# Patient Record
Sex: FEMALE | Race: WHITE | NOT HISPANIC OR LATINO | Employment: FULL TIME | URBAN - METROPOLITAN AREA
[De-identification: names, ages, dates, MRNs, and addresses within clinical notes are randomized per-mention and may not be internally consistent; named-entity substitution may affect disease eponyms.]

---

## 2017-01-30 ENCOUNTER — DOCUMENTATION ONLY (OUTPATIENT)
Dept: FAMILY MEDICINE | Facility: CLINIC | Age: 27
End: 2017-01-30

## 2017-01-30 NOTE — PROGRESS NOTES
Panel Management Review      Patient has the following on her problem list: None      Composite cancer screening  Chart review shows that this patient is due/due soon for the following Pap Smear  Summary:    Patient is due/failing the following:   PAP    Action needed:   Patient needs office visit for PAP.    Type of outreach:    Sent letter.    Questions for provider review:    None                                                                                                                                    Zhane Rush CMA ([facility]) 1/30/2017 7:33 AM

## 2017-01-30 NOTE — Clinical Note
January 30, 2017      Carleen J Oates  [address]  [facility] [address]        Dear Ms. Carleen Oates,    Our records show that you are currently due for a PAP test to screen for cervical cancer. Please call our clinic at [phone] to schedule this appointment or to update your chart if you have already had this completed.    If you have any further questions or concerns please feel free to contact us by calling our clinic at [phone].      Sincerely,     Pricila Moe PA-C/ivon

## 2017-04-05 ENCOUNTER — OFFICE VISIT (OUTPATIENT)
Dept: FAMILY MEDICINE | Facility: CLINIC | Age: 27
End: 2017-04-05
Payer: COMMERCIAL

## 2017-04-05 VITALS
DIASTOLIC BLOOD PRESSURE: 72 MMHG | OXYGEN SATURATION: 97 % | TEMPERATURE: 98.3 F | BODY MASS INDEX: 33.38 KG/M2 | HEART RATE: 78 BPM | WEIGHT: 200.6 LBS | SYSTOLIC BLOOD PRESSURE: 118 MMHG

## 2017-04-05 DIAGNOSIS — Z00.00 ENCOUNTER FOR ROUTINE ADULT HEALTH EXAMINATION WITHOUT ABNORMAL FINDINGS: Primary | ICD-10-CM

## 2017-04-05 DIAGNOSIS — Z11.3 SCREEN FOR STD (SEXUALLY TRANSMITTED DISEASE): ICD-10-CM

## 2017-04-05 PROCEDURE — G0124 SCREEN C/V THIN LAYER BY MD: HCPCS | Performed by: NURSE PRACTITIONER

## 2017-04-05 PROCEDURE — 87491 CHLMYD TRACH DNA AMP PROBE: CPT | Performed by: NURSE PRACTITIONER

## 2017-04-05 PROCEDURE — 99395 PREV VISIT EST AGE 18-39: CPT | Performed by: NURSE PRACTITIONER

## 2017-04-05 PROCEDURE — 87591 N.GONORRHOEAE DNA AMP PROB: CPT | Performed by: NURSE PRACTITIONER

## 2017-04-05 PROCEDURE — G0145 SCR C/V CYTO,THINLAYER,RESCR: HCPCS | Performed by: NURSE PRACTITIONER

## 2017-04-05 NOTE — MR AVS SNAPSHOT
After Visit Summary   4/5/2017    Carleen Oates    MRN: [id number]           Patient Information     Date Of Birth          1990        Visit Information        Provider Department      4/5/2017 8:00 AM Kelsea Short Ra, APRN [facility] [place]        Today's Diagnoses     Encounter for routine adult health examination without abnormal findings    -  1      Care Instructions      Preventive Health Recommendations  Female Ages 26 - 39  Yearly exam:   See your health care provider every year in order to    Review health changes.     Discuss preventive care.      Review your medicines if you your doctor has prescribed any.    Until age 30: Get a Pap test every three years (more often if you have had an abnormal result).    After age 30: Talk to your doctor about whether you should have a Pap test every 3 years or have a Pap test with HPV screening every 5 years.   You do not need a Pap test if your uterus was removed (hysterectomy) and you have not had cancer.  You should be tested each year for STDs (sexually transmitted diseases), if you're at risk.   Talk to your provider about how often to have your cholesterol checked.  If you are at risk for diabetes, you should have a diabetes test (fasting glucose).  Shots: Get a flu shot each year. Get a tetanus shot every 10 years.   Nutrition:     Eat at least 5 servings of fruits and vegetables each day.    Eat whole-grain bread, whole-wheat pasta and brown rice instead of white grains and rice.    Talk to your provider about Calcium and Vitamin D.     Lifestyle    Exercise at least 150 minutes a week (30 minutes a day, 5 days of the week). This will help you control your weight and prevent disease.    Limit alcohol to one drink per day.    No smoking.     Wear sunscreen to prevent skin cancer.    See your dentist every six months for an exam and cleaning.          Follow-ups after your visit        Who to contact     If you have  "questions or need follow up information about today's clinic visit or your schedule please contact [facility] directly at [phone].  Normal or non-critical lab and imaging results will be communicated to you by MyChart, letter or phone within 4 business days after the clinic has received the results. If you do not hear from us within 7 days, please contact the clinic through "[employer]hart or phone. If you have a critical or abnormal lab result, we will notify you by phone as soon as possible.  Submit refill requests through "[employer] or call your pharmacy and they will forward the refill request to us. Please allow 3 business days for your refill to be completed.          Additional Information About Your Visit        "[employer] Information     "[employer] lets you send messages to your doctor, view your test results, renew your prescriptions, schedule appointments and more. To sign up, go to [email or web address] . Click on \"Log in\" on the left side of the screen, which will take you to the Welcome page. Then click on \"Sign up Now\" on the right side of the page.     You will be asked to enter the access code listed below, as well as some personal information. Please follow the directions to create your username and password.     Your access code is: [email or web address]  Expires: 2017  8:41 AM     Your access code will  in 90 days. If you need help or a new code, please call your [place] clinic or [phone].        Care EveryWhere ID     This is your Care EveryWhere ID. This could be used by other organizations to access your [place] medical records  [id number]        Your Vitals Were     Pulse Temperature Pulse Oximetry BMI (Body Mass Index)          78 98.3  F (36.8  C) (Oral) 97% 33.38 kg/m2         Blood Pressure from Last 3 Encounters:   17 118/72   10/11/16 112/60   16 122/72    Weight from Last 3 Encounters:   17 200 lb 9.6 oz (91 kg)   10/11/16 187 lb 7 oz (85 kg) "   07/13/16 182 lb (82.6 kg)              We Performed the Following     Pap imaged thin layer screen reflex to HPV if ASCUS - recommend age 25 - 29        Primary Care Provider Office Phone # Fax #    Pricila Moe PA-C [phone]       [facility] [address]  [facility] [address]        Thank you!     Thank you for choosing [facility]  for your care. Our goal is always to provide you with excellent care. Hearing back from our patients is one way we can continue to improve our services. Please take a few minutes to complete the written survey that you may receive in the mail after your visit with us. Thank you!             Your Updated Medication List - Protect others around you: Learn how to safely use, store and throw away your medicines at www.disposemymeds.org.          This list is accurate as of: 4/5/17  8:41 AM.  Always use your most recent med list.                   Brand Name Dispense Instructions for use    ALLEGRA PO      Take 180 mg by mouth daily       levonorgestrel 20 MCG/24HR IUD    MIRENA    1 each    1 each (20 mcg) by Intrauterine route once       sertraline 100 MG tablet    ZOLOFT    90 tablet    Take 1 tablet (100 mg) by mouth daily       TYLENOL 8 HOUR PO      Take  by mouth.

## 2017-04-05 NOTE — NURSING NOTE
"Chief Complaint   Patient presents with     Physical       Initial /72  Pulse 78  Temp 98.3  F (36.8  C) (Oral)  Wt 200 lb 9.6 oz (91 kg)  SpO2 97%  BMI 33.38 kg/m2 Estimated body mass index is 33.38 kg/(m^2) as calculated from the following:    Height as of 10/11/16: 5' 5\" (1.651 m).    Weight as of this encounter: 200 lb 9.6 oz (91 kg).  Medication Reconciliation: complete   Kaley WYNN M.A.      "

## 2017-04-05 NOTE — LETTER
April 14, 2017      Carleen Oates  [address]  [facility][[address]] [address]          Dear Carleen,    The results of your recent labs were normal.  There was no infection seen.  Please let me know if you have any questions or concerns.    Thank you,    SELIN Grewal

## 2017-04-05 NOTE — LETTER
April 19, 2017      Carleen ISA Иван  [address]  [facility] [address]    Dear ,      I am happy to inform you that your recent cervical cancer screening test (PAP smear) was normal.      Preventative screenings such as this help to ensure your health for years to come. You should repeat a pap smear in 1 year, unless otherwise directed.      You will also need to return to the clinic every year for your annual exam and other preventive tests.     Please contact the clinic at [phone] if you have further questions.       Sincerely,      LISETH Grewal Ra CNP/esh

## 2017-04-06 LAB
C TRACH DNA SPEC QL NAA+PROBE: NORMAL
N GONORRHOEA DNA SPEC QL NAA+PROBE: NORMAL
SPECIMEN SOURCE: NORMAL
SPECIMEN SOURCE: NORMAL

## 2017-04-07 ENCOUNTER — TELEPHONE (OUTPATIENT)
Dept: FAMILY MEDICINE | Facility: CLINIC | Age: 27
End: 2017-04-07

## 2017-04-07 DIAGNOSIS — G47.00 INSOMNIA, UNSPECIFIED TYPE: Primary | ICD-10-CM

## 2017-04-07 RX ORDER — HYDROXYZINE HYDROCHLORIDE 25 MG/1
25-50 TABLET, FILM COATED ORAL
Qty: 60 TABLET | Refills: 1 | Status: SHIPPED | OUTPATIENT
Start: 2017-04-07 | End: 2018-04-17

## 2017-04-07 NOTE — TELEPHONE ENCOUNTER
Called patient to let her know about the prescription.   She is agreeable to try it this weekend, and let us know  How she is doing with it.   Paty Rg, RN  Triage Nurse

## 2017-04-07 NOTE — TELEPHONE ENCOUNTER
Patient seen this past week.  Patient is calling to ask for something for sleep. She has taken   Tylenol pm, and you had mentioned that you would prescribe something   For her that was similar but did not have the risk of liver problems.   (not mentioned in visit note)  She has been on Ambien, but mentioned that it was too strong for her.   Please advise.   Paty Rg, MARISSA  Triage Nurse

## 2017-04-07 NOTE — TELEPHONE ENCOUNTER
Rx sent.  Hydroxyzine.  Start with 1/2 tab, may increase if needed.  Reviewed no drinking, driving, or big decisions.    MELVIN

## 2017-04-10 LAB
COPATH REPORT: NORMAL
PAP: NORMAL

## 2017-04-19 ENCOUNTER — RESULT FOLLOW UP (OUTPATIENT)
Dept: FAMILY MEDICINE | Facility: CLINIC | Age: 27
End: 2017-04-19

## 2017-04-19 ENCOUNTER — TELEPHONE (OUTPATIENT)
Dept: FAMILY MEDICINE | Facility: CLINIC | Age: 27
End: 2017-04-19

## 2017-04-19 DIAGNOSIS — R87.612 PAPANICOLAOU SMEAR OF CERVIX WITH LOW GRADE SQUAMOUS INTRAEPITHELIAL LESION (LGSIL): ICD-10-CM

## 2017-04-19 NOTE — LETTER
April 3, 2019      Carleen J Иван  [address]  [facility] [address]    Dear ,      This letter is to remind you that you are due for your follow up PAP smear on or about 04/17/19.    Please call [phone] to schedule your appointment at your earliest convenience.     If you have completed the tests outside of [place], please have the results forwarded to our office. We will update the chart for your primary Physician to review before your next annual physical.     Sincerely,      Your [place] Care Team/[facility]

## 2017-04-19 NOTE — TELEPHONE ENCOUNTER
Pap screening from 04/05/17 = Normal    Pap history:  03/29/12 LSIL  09/20/12 [place]= GARY 1  01/22/16 Pap= NIL.   Now with current Normal result.    Kelsea- Please advise as pt's history falls outside of guidelines.  Would you like to get another normal result in 1 year before going to routine 3 yr screening?    Thank you  Lynette Nissen RN

## 2017-04-19 NOTE — LETTER
March 23, 2018      Carleen J Иван  [address]  [place] [facility][[address]] [address]    Dear ,      This letter is to remind you that you are due for your follow up PAP smear on or about 4/5/18.    Please call [phone] to schedule your appointment at your earliest convenience.     If you have completed the tests outside of [place], please have the results forwarded to our office. We will update the chart for your primary Physician to review before your next annual physical.     Sincerely,      LISETH Grewal Ra CNP/rlm

## 2017-04-19 NOTE — PROGRESS NOTES
03/29/12 LSIL  09/20/12 [place]= GARY 1, r/p 6 months, due March 2013 01/22/16 Pap= NIL. 1 yr repeat  4/05/17 Pap = NIL. 1 yr repeat pap cyto. Due 4/2018  3/23/18 Pap reminder letter sent ([facility])  4/17/18 NIL pap. Plan 1 yr repeat pap. Due 4/17/19 05/09/18 Result letter sent at request of RN. ([facility])  04/03/19 Pap reminder letter sent. ([facility])  05/07/19 [facility] clinic and schedule. ([facility])  06/04/19 Patient is lost to pap tracking follow-up. FYI routed to provider. ([facility])

## 2017-09-11 ENCOUNTER — TELEPHONE (OUTPATIENT)
Dept: FAMILY MEDICINE | Facility: CLINIC | Age: 27
End: 2017-09-11

## 2017-09-11 DIAGNOSIS — F41.1 GENERALIZED ANXIETY DISORDER: ICD-10-CM

## 2017-09-11 DIAGNOSIS — F33.0 MAJOR DEPRESSIVE DISORDER, RECURRENT EPISODE, MILD WITH ANXIOUS DISTRESS (H): ICD-10-CM

## 2017-09-12 NOTE — TELEPHONE ENCOUNTER
sertraline (ZOLOFT) 100 MG     Last Written Prescription Date: 10/11/16  Last Fill Quantity: 90, # refills: 3  Last Office Visit with G primary care provider:  4/5/17        Last PHQ-9 score on record=   PHQ-9 SCORE 12/20/2016   Total Score -   Total Score 1

## 2017-09-14 RX ORDER — SERTRALINE HYDROCHLORIDE 100 MG/1
TABLET, FILM COATED ORAL
Qty: 30 TABLET | Refills: 0 | Status: SHIPPED | OUTPATIENT
Start: 2017-09-14 | End: 2017-09-19

## 2017-09-14 NOTE — TELEPHONE ENCOUNTER
Prescription refilled x 1 per [facility] Refill Protocol.    Routing to Station Nurse Pool, please call to update a PHQ9.

## 2017-09-19 RX ORDER — SERTRALINE HYDROCHLORIDE 100 MG/1
100 TABLET, FILM COATED ORAL DAILY
Qty: 90 TABLET | Refills: 1 | Status: SHIPPED | OUTPATIENT
Start: 2017-09-19 | End: 2018-04-11

## 2017-09-19 ASSESSMENT — PATIENT HEALTH QUESTIONNAIRE - PHQ9: SUM OF ALL RESPONSES TO PHQ QUESTIONS 1-9: 2

## 2017-09-19 NOTE — TELEPHONE ENCOUNTER
Patient completed PHQ9 over phone.    PHQ-9 SCORE 11/15/2016 12/20/2016 9/19/2017   Total Score - - -   Total Score 1 1 2     Refill sent to pharmacy.    Karmen Quintero RN

## 2017-10-12 DIAGNOSIS — F33.0 MAJOR DEPRESSIVE DISORDER, RECURRENT EPISODE, MILD WITH ANXIOUS DISTRESS (H): ICD-10-CM

## 2017-10-12 DIAGNOSIS — F41.1 GENERALIZED ANXIETY DISORDER: ICD-10-CM

## 2017-10-13 RX ORDER — SERTRALINE HYDROCHLORIDE 100 MG/1
TABLET, FILM COATED ORAL
Qty: 90 TABLET | Refills: 0 | OUTPATIENT
Start: 2017-10-13

## 2017-10-13 NOTE — TELEPHONE ENCOUNTER
Chart review confirms medication was sent 9/19/17 to requesting pharmacy, 6 month supply. Sent back as denied/duplicate and attached comments to see RX already sent 9/19/17    Adelaida Reich, MSN, RN-BC  Care Coordinator

## 2017-10-13 NOTE — TELEPHONE ENCOUNTER
sertraline (ZOLOFT) 100 MG     Last Written Prescription Date: 9/19/17  Last Fill Quantity: 90, # refills: 1  Last Office Visit with G primary care provider:  4/5/17        Last PHQ-9 score on record=   PHQ-9 SCORE 9/19/2017   Total Score -   Total Score 2

## 2017-10-20 ENCOUNTER — TELEPHONE (OUTPATIENT)
Dept: OTHER | Facility: CLINIC | Age: 27
End: 2017-10-20

## 2018-04-11 DIAGNOSIS — F41.1 GENERALIZED ANXIETY DISORDER: ICD-10-CM

## 2018-04-11 DIAGNOSIS — F33.0 MAJOR DEPRESSIVE DISORDER, RECURRENT EPISODE, MILD WITH ANXIOUS DISTRESS (H): ICD-10-CM

## 2018-04-11 RX ORDER — SERTRALINE HYDROCHLORIDE 100 MG/1
TABLET, FILM COATED ORAL
Qty: 30 TABLET | Refills: 0 | Status: SHIPPED | OUTPATIENT
Start: 2018-04-11 | End: 2018-05-06

## 2018-04-11 NOTE — TELEPHONE ENCOUNTER
"Requested Prescriptions   Pending Prescriptions Disp Refills     sertraline (ZOLOFT) 100 MG tablet [Pharmacy Med Name: SERTRALINE 100MG TABLETS]  Last Written Prescription Date:  09/19/2017  Last Fill Quantity: 90 tablet,  # refills: 1   Last office visit: 4/5/2017 with prescribing provider:  Kelsea Short Ra, APRN CNP    Future Office Visit:     90 tablet 0     Sig: TAKE 1 TABLET(100 MG) BY MOUTH DAILY    SSRIs Protocol Failed    4/11/2018 10:40 AM       Failed - PHQ-9 score less than 5 in past 6 months    Please review last PHQ-9 score.   PHQ-9 SCORE 11/15/2016 12/20/2016 9/19/2017   Total Score - - -   Total Score 1 1 2     CLARIBEL-7 SCORE 10/27/2016 11/15/2016 12/20/2016   Total Score - - -   Total Score 0 0 0              Passed - Patient is age 18 or older       Passed - No active pregnancy on record       Passed - No positive pregnancy test in last 12 months       Passed - Recent (6 mo) or future (30 days) visit within the authorizing provider's specialty    Patient had office visit in the last 6 months or has a visit in the next 30 days with authorizing provider or within the authorizing provider's specialty.  See \"Patient Info\" tab in inbasket, or \"Choose Columns\" in Meds & Orders section of the refill encounter.              "

## 2018-04-11 NOTE — TELEPHONE ENCOUNTER
Pt is overdue for office visit with PCP.   Is scheduled for 4/17/18 for full Physical.   Sent in 30 day supply for the Pt.     Shyann Tapia RN -- [facility]

## 2018-04-17 ENCOUNTER — OFFICE VISIT (OUTPATIENT)
Dept: FAMILY MEDICINE | Facility: CLINIC | Age: 28
End: 2018-04-17
Payer: COMMERCIAL

## 2018-04-17 VITALS
RESPIRATION RATE: 16 BRPM | WEIGHT: 195.5 LBS | OXYGEN SATURATION: 97 % | SYSTOLIC BLOOD PRESSURE: 118 MMHG | BODY MASS INDEX: 32.57 KG/M2 | DIASTOLIC BLOOD PRESSURE: 78 MMHG | HEART RATE: 92 BPM | HEIGHT: 65 IN | TEMPERATURE: 98.1 F

## 2018-04-17 DIAGNOSIS — Z00.00 ROUTINE GENERAL MEDICAL EXAMINATION AT A HEALTH CARE FACILITY: Primary | ICD-10-CM

## 2018-04-17 DIAGNOSIS — R87.612 PAPANICOLAOU SMEAR OF CERVIX WITH LOW GRADE SQUAMOUS INTRAEPITHELIAL LESION (LGSIL): ICD-10-CM

## 2018-04-17 DIAGNOSIS — Z72.0 TOBACCO ABUSE: ICD-10-CM

## 2018-04-17 DIAGNOSIS — E66.9 OBESITY WITHOUT SERIOUS COMORBIDITY, UNSPECIFIED CLASSIFICATION, UNSPECIFIED OBESITY TYPE: ICD-10-CM

## 2018-04-17 DIAGNOSIS — Z30.09 BIRTH CONTROL COUNSELING: ICD-10-CM

## 2018-04-17 DIAGNOSIS — Z12.4 SCREENING FOR MALIGNANT NEOPLASM OF CERVIX: ICD-10-CM

## 2018-04-17 LAB
ERYTHROCYTE [DISTWIDTH] IN BLOOD BY AUTOMATED COUNT: 12.4 % (ref 10–15)
HCT VFR BLD AUTO: 39.2 % (ref 35–47)
HGB BLD-MCNC: 13.1 G/DL (ref 11.7–15.7)
MCH RBC QN AUTO: 30.5 PG (ref 26.5–33)
MCHC RBC AUTO-ENTMCNC: 33.4 G/DL (ref 31.5–36.5)
MCV RBC AUTO: 91 FL (ref 78–100)
PLATELET # BLD AUTO: 286 10E9/L (ref 150–450)
RBC # BLD AUTO: 4.3 10E12/L (ref 3.8–5.2)
WBC # BLD AUTO: 6 10E9/L (ref 4–11)

## 2018-04-17 PROCEDURE — 84443 ASSAY THYROID STIM HORMONE: CPT | Performed by: PHYSICIAN ASSISTANT

## 2018-04-17 PROCEDURE — 80061 LIPID PANEL: CPT | Performed by: PHYSICIAN ASSISTANT

## 2018-04-17 PROCEDURE — 36415 COLL VENOUS BLD VENIPUNCTURE: CPT | Performed by: PHYSICIAN ASSISTANT

## 2018-04-17 PROCEDURE — 87591 N.GONORRHOEAE DNA AMP PROB: CPT | Performed by: PHYSICIAN ASSISTANT

## 2018-04-17 PROCEDURE — 88175 CYTOPATH C/V AUTO FLUID REDO: CPT | Performed by: PHYSICIAN ASSISTANT

## 2018-04-17 PROCEDURE — 87491 CHLMYD TRACH DNA AMP PROBE: CPT | Performed by: PHYSICIAN ASSISTANT

## 2018-04-17 PROCEDURE — 99395 PREV VISIT EST AGE 18-39: CPT | Performed by: PHYSICIAN ASSISTANT

## 2018-04-17 PROCEDURE — 80053 COMPREHEN METABOLIC PANEL: CPT | Performed by: PHYSICIAN ASSISTANT

## 2018-04-17 PROCEDURE — 99212 OFFICE O/P EST SF 10 MIN: CPT | Mod: 25 | Performed by: PHYSICIAN ASSISTANT

## 2018-04-17 PROCEDURE — 88141 CYTOPATH C/V INTERPRET: CPT | Performed by: PHYSICIAN ASSISTANT

## 2018-04-17 PROCEDURE — 85027 COMPLETE CBC AUTOMATED: CPT | Performed by: PHYSICIAN ASSISTANT

## 2018-04-17 RX ORDER — MISOPROSTOL 200 UG/1
TABLET ORAL
Qty: 1 TABLET | Refills: 0 | Status: SHIPPED | OUTPATIENT
Start: 2018-04-17 | End: 2018-06-20

## 2018-04-17 RX ORDER — VARENICLINE TARTRATE 1 MG/1
1 TABLET, FILM COATED ORAL 2 TIMES DAILY
Qty: 56 TABLET | Refills: 2 | Status: SHIPPED | OUTPATIENT
Start: 2018-04-17 | End: 2019-11-05

## 2018-04-17 ASSESSMENT — ENCOUNTER SYMPTOMS
PARESTHESIAS: 0
ABDOMINAL PAIN: 0
CHILLS: 0
JOINT SWELLING: 0
COUGH: 0
HEMATOCHEZIA: 0
DIARRHEA: 0
EYE PAIN: 0
HEADACHES: 0
NAUSEA: 0
DIZZINESS: 0
FEVER: 0
HEMATURIA: 0
DYSURIA: 0
PALPITATIONS: 0
ARTHRALGIAS: 0
WEAKNESS: 0
HEARTBURN: 0
MYALGIAS: 0
CONSTIPATION: 0
FREQUENCY: 0
NERVOUS/ANXIOUS: 0
SORE THROAT: 0
SHORTNESS OF BREATH: 0

## 2018-04-17 NOTE — LETTER
May 9, 2018      Carleen J Иван  [address]  [facility] [address]    Dear ,      I am happy to inform you that your recent cervical cancer screening test (PAP smear) was normal.      Preventative screenings such as this help to ensure your health for years to come. You should repeat a pap smear in 1 year, unless otherwise directed.      You will also need to return to the clinic every year for your annual exam and other preventive tests.     Please contact the clinic at [phone] if you have further questions.       Sincerely,      Pricila Moe PA-C/shalonda
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
## 2018-04-17 NOTE — LETTER
"[facility]  [address] [address] [id number] [phone]          April 20, 2018    Carleen Oates                                                                                                                     [address]  [place] [address]            Dear Carleen,    Total cholesterol is 193, please continue exercise and watch diet.  Triglycerides are normal at 122, this is simple sugar and fat in blood. HDL which is the \"good\" cholesterol (heart protective)  is low at 37. Increase this with more exercise.  The LDL or \"bad\" cholesterol is at 132 but does not require medication at this time.   Your CBC shows no evidence of infection or anemia.  Your CMP reveals normal kidney function, liver function and electrolytes. Your fasting sugar was normal.  Your thyroid test was normal as well.    Sincerely,         Pricila Moe PA-C    "

## 2018-04-17 NOTE — MR AVS SNAPSHOT
After Visit Summary   4/17/2018    Carleen Oates    MRN: [id number]           Patient Information     Date Of Birth          1990        Visit Information        Provider Department      4/17/2018 7:10 AM Pricila Moe PA-C [facility]        Today's Diagnoses     Routine general medical examination at a health care facility    -  1    Screening for malignant neoplasm of cervix        Birth control counseling        Papanicolaou smear of cervix with low grade squamous intraepithelial lesion (LGSIL)        Tobacco abuse        Obesity without serious comorbidity, unspecified classification, unspecified obesity type          Care Instructions      Preventive Health Recommendations  Female Ages 26 - 39  Yearly exam:   See your health care provider every year in order to    Review health changes.     Discuss preventive care.      Review your medicines if you your doctor has prescribed any.    Until age 30: Get a Pap test every three years (more often if you have had an abnormal result).    After age 30: Talk to your doctor about whether you should have a Pap test every 3 years or have a Pap test with HPV screening every 5 years.   You do not need a Pap test if your uterus was removed (hysterectomy) and you have not had cancer.  You should be tested each year for STDs (sexually transmitted diseases), if you're at risk.   Talk to your provider about how often to have your cholesterol checked.  If you are at risk for diabetes, you should have a diabetes test (fasting glucose).  Shots: Get a flu shot each year. Get a tetanus shot every 10 years.   Nutrition:     Eat at least 5 servings of fruits and vegetables each day.    Eat whole-grain bread, whole-wheat pasta and brown rice instead of white grains and rice.    Talk to your provider about Calcium and Vitamin D.     Lifestyle    Exercise at least 150 minutes a week (30 minutes a day, 5 days of the week). This will help you  "control your weight and prevent disease.    Limit alcohol to one drink per day.    No smoking.     Wear sunscreen to prevent skin cancer.    See your dentist every six months for an exam and cleaning.            Follow-ups after your visit        Follow-up notes from your care team     Return in about 3 weeks (around 5/7/2018) for IUD insertion.      Your next 10 appointments already scheduled     May 07, 2018  3:50 PM CDT   IUD INSERTION AND REMOVAL with Pricila Moe PA-C   [facility]    [address] [address] [phone]              Who to contact     If you have questions or need follow up information about today's clinic visit or your schedule please contact [facility] directly at [phone].  Normal or non-critical lab and imaging results will be communicated to you by MyChart, letter or phone within 4 business days after the clinic has received the results. If you do not hear from us within 7 days, please contact the clinic through MyChart or phone. If you have a critical or abnormal lab result, we will notify you by phone as soon as possible.  Submit refill requests through [employer] or call your pharmacy and they will forward the refill request to us. Please allow 3 business days for your refill to be completed.          Additional Information About Your Visit        [employer] Information     [employer] lets you send messages to your doctor, view your test results, renew your prescriptions, schedule appointments and more. To sign up, go to [email or web address] . Click on \"Log in\" on the left side of the screen, which will take you to the Welcome page. Then click on \"Sign up Now\" on the right side of the page.     You will be asked to enter the access code listed below, as well as some personal information. Please follow the directions to create your username and password.     Your access code is: " [email or web address]  Expires: 2018  7:36 AM     Your access code will  in 90 days. If you need help or a new code, please call your [place] clinic or [phone].        Care EveryWhere ID     This is your Care EveryWhere ID. This could be used by other organizations to access your [place] medical records  [id number]        Your Vitals Were     Pulse Temperature Respirations Height Last Period Pulse Oximetry    92 98.1  F (36.7  C) (Oral) 16 5' 5\" (1.651 m) (LMP Unknown) 97%    Breastfeeding? BMI (Body Mass Index)                No 32.53 kg/m2           Blood Pressure from Last 3 Encounters:   18 118/78   17 118/72   10/11/16 112/60    Weight from Last 3 Encounters:   18 195 lb 8 oz (88.7 kg)   17 200 lb 9.6 oz (91 kg)   10/11/16 187 lb 7 oz (85 kg)              We Performed the Following     CBC with platelets     Chlamydia trachomatis PCR     Comprehensive metabolic panel (BMP + Alb, Alk Phos, ALT, AST, Total. Bili, TP)     Lipid panel reflex to direct LDL Fasting     Neisseria gonorrhoeae PCR     PAP imaged thin layer, diagnostic     TSH with free T4 reflex          Today's Medication Changes          These changes are accurate as of 18  7:36 AM.  If you have any questions, ask your nurse or doctor.               Start taking these medicines.        Dose/Directions    misoprostol 200 MCG tablet   Commonly known as:  CYTOTEC   Used for:  Screening for malignant neoplasm of cervix   Started by:  Pricila Moe PA-C        Take 1 tab po 12 hours prior to procedure   Quantity:  1 tablet   Refills:  0       * varenicline 0.5 MG X 11 & 1 MG X 42 tablet   Commonly known as:  CHANTIX STARTING MONTH STEVO   Used for:  Tobacco abuse   Started by:  Pricila Moe PA-C        Take 0.5 mg tab daily for 3 days, then 0.5 mg tab twice daily for 4 days, then 1 mg twice daily.   Quantity:  53 tablet   Refills:  0       * varenicline 1 MG tablet   Commonly known as:  CHANTIX "   Used for:  Tobacco abuse   Started by:  Pricila Moe PA-C        Dose:  1 mg   Take 1 tablet (1 mg) by mouth 2 times daily   Quantity:  56 tablet   Refills:  2       * Notice:  This list has 2 medication(s) that are the same as other medications prescribed for you. Read the directions carefully, and ask your doctor or other care provider to review them with you.      Stop taking these medicines if you haven't already. Please contact your care team if you have questions.     hydrOXYzine 25 MG tablet   Commonly known as:  ATARAX   Stopped by:  Pricila Moe PA-C                Where to get your medicines      These medications were sent to [id number] - [place] - [address]  [address]  [address] [place] [address]     Phone:  [phone]     misoprostol 200 MCG tablet    varenicline 0.5 MG X 11 & 1 MG X 42 tablet    varenicline 1 MG tablet                Primary Care Provider Office Phone # Fax #    Pricila Moe PA-C [phone] [address] [address]        Equal Access to Services     [facility]: Hadii aad ku hadasho Soomaali, waaxda luqadaha, qaybta kaalmada adeegyada, waxay idiin hayaan adenamita tucker laang ah. So [facility] [phone].    ATENCIÓN: Si habla español, tiene a blount disposición servicios gratuitos de asistencia lingüística. [facility] [phone].    We comply with applicable federal civil rights laws and [place] laws. We do not discriminate on the basis of race, color, national origin, age, disability, sex, sexual orientation, or gender identity.            Thank you!     Thank you for choosing [facility]  for your care. Our goal is always to provide you with excellent care. Hearing back from our patients is one way we can continue to improve our services. Please take a few minutes to complete the written survey that you may receive in the mail after  your visit with us. Thank you!             Your Updated Medication List - Protect others around you: Learn how to safely use, store and throw away your medicines at www.disposemymeds.org.          This list is accurate as of 4/17/18  7:36 AM.  Always use your most recent med list.                   Brand Name Dispense Instructions for use Diagnosis    ALLEGRA PO      Take 180 mg by mouth daily        levonorgestrel 20 MCG/24HR IUD    MIRENA    1 each    1 each (20 mcg) by Intrauterine route once        misoprostol 200 MCG tablet    CYTOTEC    1 tablet    Take 1 tab po 12 hours prior to procedure    Screening for malignant neoplasm of cervix       sertraline 100 MG tablet    ZOLOFT    30 tablet    TAKE 1 TABLET(100 MG) BY MOUTH DAILY    Major depressive disorder, recurrent episode, mild with anxious distress (H), Generalized anxiety disorder       TYLENOL 8 HOUR PO      Take  by mouth.        * varenicline 0.5 MG X 11 & 1 MG X 42 tablet    CHANTIX STARTING MONTH STEVO    53 tablet    Take 0.5 mg tab daily for 3 days, then 0.5 mg tab twice daily for 4 days, then 1 mg twice daily.    Tobacco abuse       * varenicline 1 MG tablet    CHANTIX    56 tablet    Take 1 tablet (1 mg) by mouth 2 times daily    Tobacco abuse       * Notice:  This list has 2 medication(s) that are the same as other medications prescribed for you. Read the directions carefully, and ask your doctor or other care provider to review them with you.

## 2018-04-18 LAB
ALBUMIN SERPL-MCNC: 3.9 G/DL (ref 3.4–5)
ALP SERPL-CCNC: 62 U/L (ref 40–150)
ALT SERPL W P-5'-P-CCNC: 13 U/L (ref 0–50)
ANION GAP SERPL CALCULATED.3IONS-SCNC: 11 MMOL/L (ref 3–14)
AST SERPL W P-5'-P-CCNC: 14 U/L (ref 0–45)
BILIRUB SERPL-MCNC: 0.4 MG/DL (ref 0.2–1.3)
BUN SERPL-MCNC: 10 MG/DL (ref 7–30)
C TRACH DNA SPEC QL NAA+PROBE: NEGATIVE
CALCIUM SERPL-MCNC: 8.9 MG/DL (ref 8.5–10.1)
CHLORIDE SERPL-SCNC: 108 MMOL/L (ref 94–109)
CHOLEST SERPL-MCNC: 193 MG/DL
CO2 SERPL-SCNC: 21 MMOL/L (ref 20–32)
CREAT SERPL-MCNC: 0.8 MG/DL (ref 0.52–1.04)
GFR SERPL CREATININE-BSD FRML MDRD: 84 ML/MIN/1.7M2
GLUCOSE SERPL-MCNC: 98 MG/DL (ref 70–99)
HDLC SERPL-MCNC: 37 MG/DL
LDLC SERPL CALC-MCNC: 132 MG/DL
N GONORRHOEA DNA SPEC QL NAA+PROBE: NEGATIVE
NONHDLC SERPL-MCNC: 156 MG/DL
POTASSIUM SERPL-SCNC: 4.6 MMOL/L (ref 3.4–5.3)
PROT SERPL-MCNC: 7.4 G/DL (ref 6.8–8.8)
SODIUM SERPL-SCNC: 140 MMOL/L (ref 133–144)
SPECIMEN SOURCE: NORMAL
SPECIMEN SOURCE: NORMAL
TRIGL SERPL-MCNC: 122 MG/DL
TSH SERPL DL<=0.005 MIU/L-ACNC: 1.61 MU/L (ref 0.4–4)

## 2018-04-21 LAB
COPATH REPORT: NORMAL
PAP: NORMAL

## 2018-04-23 PROBLEM — E66.9 OBESITY: Status: ACTIVE | Noted: 2018-04-23

## 2018-04-24 ENCOUNTER — TELEPHONE (OUTPATIENT)
Dept: FAMILY MEDICINE | Facility: CLINIC | Age: 28
End: 2018-04-24

## 2018-04-24 NOTE — TELEPHONE ENCOUNTER
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 1 yr repeat  4/05/17 Pap = NIL. 1 yr repeat pap cyto.   4/17/18 NIL (current)    Pricila- Please advise when you would like patient to repeat a pap again, ok to go to 3 yr co-test?    Also, please advise if you would like any follow up or treatment for presence of Actinomyces?  Patient has had IUD in place for 6 years, will be scheduling a visit for replacement.  Per visit notes:   (female): normal female external genitalia, normal urethral meatus, vaginal mucosa pink, moist, well rugated, and normal cervix/adnexa/uterus without masses or discharge, IUD strings visible    Thank you  Lynette Nissen RN

## 2018-04-25 NOTE — TELEPHONE ENCOUNTER
Please call pt.  Let her know that her pap smear showed an incidental finding called actinomyces.  The pap smear is actually not great at identifying this and in about 1/2 of the cases where they think this is present-- it is not.    This is something that is naturally seen in other parts of the body without problem.    We like to make sure when women have IUDs in place that they are not having any signs of issues with this as sometimes this can be associated with more problematic infections.  Is she having any pelvic pain or vaginal discharge?  If not, she should watch for anything like this and schedule with Pricila if this occurs in the future.  She should also have a consult with Pricila about removal and replacement of her IUD so they can talk more about this finding.    We typically don't do more testing if she has no symptoms.  If she is having any symptoms she should be seen for a visit.  MELVIN

## 2018-05-06 DIAGNOSIS — F33.0 MAJOR DEPRESSIVE DISORDER, RECURRENT EPISODE, MILD WITH ANXIOUS DISTRESS (H): ICD-10-CM

## 2018-05-06 DIAGNOSIS — F41.1 GENERALIZED ANXIETY DISORDER: ICD-10-CM

## 2018-05-06 NOTE — TELEPHONE ENCOUNTER
"Requested Prescriptions   Pending Prescriptions Disp Refills     sertraline (ZOLOFT) 100 MG tablet [Pharmacy Med Name: SERTRALINE 100MG TABLETS]  Last Written Prescription Date:  04/11/2018  Last Fill Quantity: 30 tablet,  # refills: 0   Last office visit: 4/17/2018 with prescribing provider:      Pricila Moe PA-C        Future Office Visit:   Next 5 appointments (look out 90 days)     May 07, 2018  3:50 PM CDT   IUD INSERTION AND REMOVAL with Pricila Moe PA-C   [facility] ([facility]    [address] [address] [phone]                  30 tablet 0     Sig: TAKE 1 TABLET BY MOUTH DAILY.    SSRIs Protocol Failed    5/6/2018 10:27 AM       Failed - PHQ-9 score less than 5 in past 6 months    Please review last PHQ-9 score.   PHQ-9 SCORE 11/15/2016 12/20/2016 9/19/2017   Total Score - - -   Total Score 1 1 2     CLARIBEL-7 SCORE 10/27/2016 11/15/2016 12/20/2016   Total Score - - -   Total Score 0 0 0                Passed - Patient is age 18 or older       Passed - No active pregnancy on record       Passed - No positive pregnancy test in last 12 months       Passed - Recent (6 mo) or future (30 days) visit within the authorizing provider's specialty    Patient had office visit in the last 6 months or has a visit in the next 30 days with authorizing provider or within the authorizing provider's specialty.  See \"Patient Info\" tab in inbasket, or \"Choose Columns\" in Meds & Orders section of the refill encounter.              "

## 2018-05-07 ENCOUNTER — OFFICE VISIT (OUTPATIENT)
Dept: FAMILY MEDICINE | Facility: CLINIC | Age: 28
End: 2018-05-07
Payer: COMMERCIAL

## 2018-05-07 VITALS
HEIGHT: 65 IN | WEIGHT: 197.5 LBS | TEMPERATURE: 98.2 F | OXYGEN SATURATION: 97 % | RESPIRATION RATE: 16 BRPM | BODY MASS INDEX: 32.9 KG/M2 | HEART RATE: 87 BPM | DIASTOLIC BLOOD PRESSURE: 84 MMHG | SYSTOLIC BLOOD PRESSURE: 122 MMHG

## 2018-05-07 DIAGNOSIS — Z97.5 IUD (INTRAUTERINE DEVICE) IN PLACE: ICD-10-CM

## 2018-05-07 DIAGNOSIS — Z30.433 ENCOUNTER FOR REMOVAL AND REINSERTION OF IUD: Primary | ICD-10-CM

## 2018-05-07 LAB — BETA HCG QUAL IFA URINE: NEGATIVE

## 2018-05-07 PROCEDURE — 58300 INSERT INTRAUTERINE DEVICE: CPT | Performed by: PHYSICIAN ASSISTANT

## 2018-05-07 PROCEDURE — 84703 CHORIONIC GONADOTROPIN ASSAY: CPT | Performed by: PHYSICIAN ASSISTANT

## 2018-05-07 PROCEDURE — 58301 REMOVE INTRAUTERINE DEVICE: CPT | Performed by: PHYSICIAN ASSISTANT

## 2018-05-07 NOTE — MR AVS SNAPSHOT
After Visit Summary   5/7/2018    Carleen Oates    MRN: [id number]           Patient Information     Date Of Birth          1990        Visit Information        Provider Department      5/7/2018 3:50 PM Pricila Moe PA-C [facility]        Today's Diagnoses     Encounter for removal and reinsertion of IUD    -  1    IUD (intrauterine device) in place          Care Instructions    You may experience a period or spotting that will last over the next week. This usually starts to lighten after a few days. It takes about a month for this spotting is usually gone, unless you are one of the few that experience spotting that can last up to 3-6 months. You may experience cramping for the remainder of the day and continue to take ibuprofen 2-3 tabs every six hours as needed. This will likely subside over the next day.      Please contact me by [employer] of phone if you should have any unusual symptoms or concerns in the mean time.           Follow-ups after your visit        Follow-up notes from your care team     Return in about 4 weeks (around 6/4/2018) for IUD check up.      Your next 10 appointments already scheduled     Jun 05, 2018  7:30 AM CDT   SHORT with Pricila Moe PA-C   [facility] ([facility]    [address] [address] [phone]              Who to contact     If you have questions or need follow up information about today's clinic visit or your schedule please contact [facility] directly at [phone].  Normal or non-critical lab and imaging results will be communicated to you by MyChart, letter or phone within 4 business days after the clinic has received the results. If you do not hear from us within 7 days, please contact the clinic through MyChart or phone. If you have a critical or abnormal lab result, we will notify you by phone as soon as possible.  Submit refill  "requests through [employer] or call your pharmacy and they will forward the refill request to us. Please allow 3 business days for your refill to be completed.          Additional Information About Your Visit        [employer] Information     [employer] lets you send messages to your doctor, view your test results, renew your prescriptions, schedule appointments and more. To sign up, go to [email or web address] . Click on \"Log in\" on the left side of the screen, which will take you to the Welcome page. Then click on \"Sign up Now\" on the right side of the page.     You will be asked to enter the access code listed below, as well as some personal information. Please follow the directions to create your username and password.     Your access code is: [email or web address]  Expires: 2018  7:36 AM     Your access code will  in 90 days. If you need help or a new code, please call your [place] clinic or [phone].        Care EveryWhere ID     This is your Care EveryWhere ID. This could be used by other organizations to access your [place] medical records  [id number]        Your Vitals Were     Pulse Temperature Respirations Height Last Period Pulse Oximetry    87 98.2  F (36.8  C) (Oral) 16 5' 5\" (1.651 m) (LMP Unknown) 97%    Breastfeeding? BMI (Body Mass Index)                No 32.87 kg/m2           Blood Pressure from Last 3 Encounters:   18 122/84   18 118/78   17 118/72    Weight from Last 3 Encounters:   18 197 lb 8 oz (89.6 kg)   18 195 lb 8 oz (88.7 kg)   17 200 lb 9.6 oz (91 kg)              We Performed the Following     Beta HCG Qual, Urine - [facility] and [place] ([id number])     HC LEVONORGESTREL IU 52MG 5 YR     INSERTION INTRAUTERINE DEVICE     REMOVE INTRAUTERINE DEVICE          Where to get your medicines      Some of these will need a paper prescription and others can be bought over the counter.  Ask your nurse if you have questions.     You don't need a prescription for " these medications     levonorgestrel 20 MCG/24HR IUD          Primary Care Provider Office Phone # Fax #    Pricila Moe PA-C [phone]       [address]  [facility] [address]        Equal Access to Services     [facility]ANCIS[facility] MIL : Hadii aad ku hadasho Soomaali, waaxda luqadaha, qaybta kaalmada adeegyada, waxay jaylenin hayaan adeeg dionnawalt laang . So [facility] [phone].    ATENCIÓN: Si habla español, tiene a blount disposición servicios gratuitos de asistencia lingüística. Llame al [phone].    We comply with applicable federal civil rights laws and [place] laws. We do not discriminate on the basis of race, color, national origin, age, disability, sex, sexual orientation, or gender identity.            Thank you!     Thank you for choosing [facility]  for your care. Our goal is always to provide you with excellent care. Hearing back from our patients is one way we can continue to improve our services. Please take a few minutes to complete the written survey that you may receive in the mail after your visit with us. Thank you!             Your Updated Medication List - Protect others around you: Learn how to safely use, store and throw away your medicines at www.disposemymeds.org.          This list is accurate as of 5/7/18  4:36 PM.  Always use your most recent med list.                   Brand Name Dispense Instructions for use Diagnosis    ALLEGRA PO      Take 180 mg by mouth daily        levonorgestrel 20 MCG/24HR IUD    MIRENA    1 each    1 each (20 mcg) by Intrauterine route once for 1 dose    Encounter for removal and reinsertion of IUD       misoprostol 200 MCG tablet    CYTOTEC    1 tablet    Take 1 tab po 12 hours prior to procedure    Screening for malignant neoplasm of cervix       sertraline 100 MG tablet    ZOLOFT    30 tablet    TAKE 1 TABLET(100 MG) BY MOUTH DAILY    Major depressive disorder, recurrent episode, mild with anxious distress (H), Generalized  anxiety disorder       TYLENOL 8 HOUR PO      Take  by mouth.        * varenicline 0.5 MG X 11 & 1 MG X 42 tablet    CHANTIX STARTING MONTH STEVO    53 tablet    Take 0.5 mg tab daily for 3 days, then 0.5 mg tab twice daily for 4 days, then 1 mg twice daily.    Tobacco abuse       * varenicline 1 MG tablet    CHANTIX    56 tablet    Take 1 tablet (1 mg) by mouth 2 times daily    Tobacco abuse       * Notice:  This list has 2 medication(s) that are the same as other medications prescribed for you. Read the directions carefully, and ask your doctor or other care provider to review them with you.

## 2018-05-07 NOTE — PATIENT INSTRUCTIONS
You may experience a period or spotting that will last over the next week. This usually starts to lighten after a few days. It takes about a month for this spotting is usually gone, unless you are one of the few that experience spotting that can last up to 3-6 months. You may experience cramping for the remainder of the day and continue to take ibuprofen 2-3 tabs every six hours as needed. This will likely subside over the next day.      Please contact me by mychart of phone if you should have any unusual symptoms or concerns in the mean time.

## 2018-05-07 NOTE — PROCEDURES
Chief Complaint/ History of Present Illness:    Carleen Oates is a 27 year old female.   No LMP recorded (lmp unknown). Patient is not currently having periods (Reason: IUD)..  A pregnancy test was done today.  She is currently using IUD for birth control.   A chlamydia/gonorrhea test was was done and negative.  She is here for an IUD removal and insertion.  Patient has been given written information.  I have reviewed the risks of the IUD including pregnancy, PID, life threatening infection, perforation, expulsion, cramping, changes in bleeding and ovarian cysts. Benefits of the IUD and alternative family planning methods have been discussed.  Patients questions are answered.  Patient has verbalized understanding of risks and benefits and has signed the consent form.    No Known Allergies  Current Outpatient Prescriptions   Medication Sig Dispense Refill     levonorgestrel (MIRENA) 20 MCG/24HR IUD 1 each (20 mcg) by Intrauterine route once for 1 dose 1 each 0     Acetaminophen (TYLENOL 8 HOUR PO) Take  by mouth.       Fexofenadine HCl (ALLEGRA PO) Take 180 mg by mouth daily       misoprostol (CYTOTEC) 200 MCG tablet Take 1 tab po 12 hours prior to procedure 1 tablet 0     sertraline (ZOLOFT) 100 MG tablet TAKE 1 TABLET(100 MG) BY MOUTH DAILY 30 tablet 0     varenicline (CHANTIX STARTING MONTH STEVO) 0.5 MG X 11 & 1 MG X 42 tablet Take 0.5 mg tab daily for 3 days, then 0.5 mg tab twice daily for 4 days, then 1 mg twice daily. 53 tablet 0     varenicline (CHANTIX) 1 MG tablet Take 1 tablet (1 mg) by mouth 2 times daily 56 tablet 2     [DISCONTINUED] levonorgestrel (MIRENA) 20 MCG/24HR IUD 1 each (20 mcg) by Intrauterine route once 1 each 0      Past Medical History:   Diagnosis Date     Anemia      Depression      Past Surgical History:   Procedure Laterality Date     NO HISTORY OF SURGERY         REVIEW OF SYSTEMS  CONSTITUTIONAL: Denies fever, chills and night sweats  BREASTS:  Denies discharge and lumps.     HEART/LUNGS: Denies dyspnea, wheezing, coughing and chest pain.  HEMATOLOGIC: Denies tendency to bruise and history of blood clots.  GENITOURINARY:  Denies urgency, dysuria and hematuria.  NEUROLOGIC:  Denies seizures, weakness and fainting.  PSYCHIATRIC: Negative for changes in mood or affect      EXAM:  LMP  (LMP Unknown)soft, nontender, without hepatosplenomegaly or masses  Abdomen: soft, nontender, without hepatosplenomegaly or masses  : normal cervix, adnexae, and uterus without masses or discharge  IUD type: Mirena  Lot # [id number]    Procedure: IUD removal  Site (location and laterality): Intrauterine - per vagina  Pelvic exam: uterus anteverted. Cervix midline. No cervical motion tenderness. No adnexal tenderness. Nocervicitis.  Speculum placed. The IUD strings are seen at external os and grasped with sterile ring  forceps and removed without difficulty. An IUD hook or other device was not needed. Patient tolerate the procedure well. There was not a complication.    Procedure:  Uterus assessed for position and is Anteverted.  Speculum inserted.  Betadine prep of cervix done.  Tenaculum applied at 11 and 1 o'clock position and gentle traction was applied to elongate the cervical canal.  Uterus sounded to 6 cm's.  Cervical dilators were not used.   IUD inserted in the usual fashion without problems, ie: resistance, severe protracted pain or excessive bleeding.  Tenaculum was removed with scant bleeding from the tenaculum site that was managed with some direct pressure using West swabs.  Strings trimmed to 2 cm's.  Patient tolerated the procedure well without any prolonged pain or syncopy.      ASSESSMENT/ PLAN:  (Z30.433) Encounter for removal and reinsertion of IUD  (primary encounter diagnosis)  Comment: Plan: IUD insertion today and needs 7 days of a back up method such as condoms before a hormonal method is effective. Does not protect against STDs  Plan: REMOVE INTRAUTERINE DEVICE, INSERTION          INTRAUTERINE DEVICE, levonorgestrel (MIRENA) 20        MCG/24HR IUD, HC LEVONORGESTREL IU 52MG 5 YR      Instructions given to patient regarding checking IUD strings, returning to the clinic if pain or inability to check strings and/or irregular bleeding.    Pt to RTC in 4-6 weeks for IUD check.    Pricila Moe PA-C    Exp: 10/1/2020  Lot:[id number]  NDC: 87997-296-92
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
## 2018-05-08 NOTE — TELEPHONE ENCOUNTER
Santo Collins,    I told Carleen about the Actinomyces as well as our plan to repeat pap in 1 year.    Pricila Moe PA-C

## 2018-05-08 NOTE — TELEPHONE ENCOUNTER
Santo Mcnulty-  The patient was seen in the clinic yesterday, 5/7/18, for IUD removal and insertion.    Please see below, do you know if the patient was notified of the Actinomyces on her pap at her visit yesterday?    Thank you  Lynette Nissen RN

## 2018-05-10 NOTE — TELEPHONE ENCOUNTER
Routing refill request to provider for review/approval because:  Isadora given x1 and patient had follow up office visit but depression or anxiety medication were not discussed    Please advise if patient should be seen or ok for telephone visit for medication re-eval.    PHQ-9 score:    PHQ-9 SCORE 9/19/2017   Total Score -   Total Score 2       Jenise ELIZABETH RN, BSN, PHN  Neena Tan RN

## 2018-05-11 RX ORDER — SERTRALINE HYDROCHLORIDE 100 MG/1
TABLET, FILM COATED ORAL
Qty: 30 TABLET | Refills: 0 | Status: SHIPPED | OUTPATIENT
Start: 2018-05-11 | End: 2018-06-14

## 2018-06-14 DIAGNOSIS — F33.0 MAJOR DEPRESSIVE DISORDER, RECURRENT EPISODE, MILD WITH ANXIOUS DISTRESS (H): ICD-10-CM

## 2018-06-14 DIAGNOSIS — F41.1 GENERALIZED ANXIETY DISORDER: ICD-10-CM

## 2018-06-14 NOTE — TELEPHONE ENCOUNTER
"Requested Prescriptions   Pending Prescriptions Disp Refills     sertraline (ZOLOFT) 100 MG tablet [Pharmacy Med Name: SERTRALINE 100MG TABLETS]  Last Written Prescription Date:  5/11/18  Last Fill Quantity: 30,  # refills: 0   Last office visit: 5/7/2018 with prescribing provider:  5/7/2018     Future Office Visit:   Next 5 appointments (look out 90 days)     Jun 20, 2018  9:10 AM CDT   SHORT with Pricila Moe PA-C   [facility]    [address] [address] [phone]                  30 tablet 0     Sig: TAKE 1 TABLET BY MOUTH DAILY.    SSRIs Protocol Failed    6/14/2018 10:28 AM       Failed - PHQ-9 score less than 5 in past 6 months    Please review last PHQ-9 score.   PHQ-9 SCORE 11/15/2016 12/20/2016 9/19/2017   Total Score - - -   Total Score 1 1 2     CLARIBEL-7 SCORE 10/27/2016 11/15/2016 12/20/2016   Total Score - - -   Total Score 0 0 0              Passed - Patient is age 18 or older       Passed - No active pregnancy on record       Passed - No positive pregnancy test in last 12 months       Passed - Recent (6 mo) or future (30 days) visit within the authorizing provider's specialty    Patient had office visit in the last 6 months or has a visit in the next 30 days with authorizing provider or within the authorizing provider's specialty.  See \"Patient Info\" tab in inbasket, or \"Choose Columns\" in Meds & Orders section of the refill encounter.              "

## 2018-06-19 RX ORDER — SERTRALINE HYDROCHLORIDE 100 MG/1
TABLET, FILM COATED ORAL
Qty: 30 TABLET | Refills: 0 | Status: SHIPPED | OUTPATIENT
Start: 2018-06-19 | End: 2018-06-20

## 2018-06-19 NOTE — TELEPHONE ENCOUNTER
sertraline (ZOLOFT) 100 MG tablet  Routing refill request to provider for review/approval because:  Isadora given x1 and patient did not follow up, please advise  Patient needs to be seen because:  Due for OV, mood was not addressed at OV 05/07/2018  Next 5 appointments (look out 90 days)     Jun 20, 2018  9:10 AM CDT   [facility] with Pricila Moe PA-C   [facility]    [address] [address] [phone]                Arabella Farrell, RN, BSN

## 2018-06-20 ENCOUNTER — OFFICE VISIT (OUTPATIENT)
Dept: FAMILY MEDICINE | Facility: CLINIC | Age: 28
End: 2018-06-20
Payer: COMMERCIAL

## 2018-06-20 VITALS
BODY MASS INDEX: 32.67 KG/M2 | WEIGHT: 196.1 LBS | TEMPERATURE: 97.8 F | DIASTOLIC BLOOD PRESSURE: 74 MMHG | HEART RATE: 92 BPM | RESPIRATION RATE: 16 BRPM | SYSTOLIC BLOOD PRESSURE: 124 MMHG | HEIGHT: 65 IN | OXYGEN SATURATION: 97 %

## 2018-06-20 DIAGNOSIS — F41.1 GENERALIZED ANXIETY DISORDER: ICD-10-CM

## 2018-06-20 DIAGNOSIS — Z30.431 IUD CHECK UP: Primary | ICD-10-CM

## 2018-06-20 DIAGNOSIS — F33.0 MAJOR DEPRESSIVE DISORDER, RECURRENT EPISODE, MILD WITH ANXIOUS DISTRESS (H): ICD-10-CM

## 2018-06-20 PROCEDURE — 99213 OFFICE O/P EST LOW 20 MIN: CPT | Performed by: PHYSICIAN ASSISTANT

## 2018-06-20 RX ORDER — SERTRALINE HYDROCHLORIDE 100 MG/1
TABLET, FILM COATED ORAL
Qty: 90 TABLET | Refills: 3 | Status: SHIPPED | OUTPATIENT
Start: 2018-06-20 | End: 2019-07-13

## 2018-06-20 ASSESSMENT — ANXIETY QUESTIONNAIRES
1. FEELING NERVOUS, ANXIOUS, OR ON EDGE: SEVERAL DAYS
6. BECOMING EASILY ANNOYED OR IRRITABLE: SEVERAL DAYS
5. BEING SO RESTLESS THAT IT IS HARD TO SIT STILL: NOT AT ALL
GAD7 TOTAL SCORE: 4
3. WORRYING TOO MUCH ABOUT DIFFERENT THINGS: SEVERAL DAYS
2. NOT BEING ABLE TO STOP OR CONTROL WORRYING: SEVERAL DAYS
IF YOU CHECKED OFF ANY PROBLEMS ON THIS QUESTIONNAIRE, HOW DIFFICULT HAVE THESE PROBLEMS MADE IT FOR YOU TO DO YOUR WORK, TAKE CARE OF THINGS AT HOME, OR GET ALONG WITH OTHER PEOPLE: NOT DIFFICULT AT ALL
7. FEELING AFRAID AS IF SOMETHING AWFUL MIGHT HAPPEN: NOT AT ALL

## 2018-06-20 ASSESSMENT — PATIENT HEALTH QUESTIONNAIRE - PHQ9: 5. POOR APPETITE OR OVEREATING: NOT AT ALL

## 2018-06-20 NOTE — LETTER
My Depression Action Plan  Name: Carleen Oates   Date of Birth 1990  Date: 6/20/2018    My doctor: Pricila Moe   My clinic: [facility]  [address] [address] [phone]          GREEN    ZONE   Good Control    What it looks like:     Things are going generally well. You have normal up s and down s. You may even feel depressed from time to time, but bad moods usually last less than a day.   What you need to do:  1. Continue to care for yourself (see self care plan)  2. Check your depression survival kit and update it as needed  3. Follow your physician s recommendations including any medication.  4. Do not stop taking medication unless you consult with your physician first.           YELLOW         ZONE Getting Worse    What it looks like:     Depression is starting to interfere with your life.     It may be hard to get out of bed; you may be starting to isolate yourself from others.    Symptoms of depression are starting to last most all day and this has happened for several days.     You may have suicidal thoughts but they are not constant.   What you need to do:     1. Call your care team, your response to treatment will improve if you keep your care team informed of your progress. Yellow periods are signs an adjustment may need to be made.     2. Continue your self-care, even if you have to fake it!    3. Talk to someone in your support network    4. Open up your depression survival kit           RED    ZONE Medical Alert - Get Help    What it looks like:     Depression is seriously interfering with your life.     You may experience these or other symptoms: You can t get out of bed most days, can t work or engage in other necessary activities, you have trouble taking care of basic hygiene, or basic responsibilities, thoughts of suicide or death that will not go away, self-injurious behavior.     What you need to do:  1. Call your care  team and request a same-day appointment. If they are not available (weekends or after hours) call your local crisis line, emergency room or 911.            Depression Self Care Plan / Survival Kit    Self-Care for Depression  Here s the deal. Your body and mind are really not as separate as most people think.  What you do and think affects how you feel and how you feel influences what you do and think. This means if you do things that people who feel good do, it will help you feel better.  Sometimes this is all it takes.  There is also a place for medication and therapy depending on how severe your depression is, so be sure to consult with your medical provider and/ or Behavioral Health Consultant if your symptoms are worsening or not improving.     In order to better manage my stress, I will:    Exercise  Get some form of exercise, every day. This will help reduce pain and release endorphins, the  feel good  chemicals in your brain. This is almost as good as taking antidepressants!  This is not the same as joining a gym and then never going! (they count on that by the way ) It can be as simple as just going for a walk or doing some gardening, anything that will get you moving.      Hygiene   Maintain good hygiene (Get out of bed in the morning, Make your bed, Brush your teeth, Take a shower, and Get dressed like you were going to work, even if you are unemployed).  If your clothes don't fit try to get ones that do.    Diet  I will strive to eat foods that are good for me, drink plenty of water, and avoid excessive sugar, caffeine, alcohol, and other mood-altering substances.  Some foods that are helpful in depression are: complex carbohydrates, B vitamins, flaxseed, fish or fish oil, fresh fruits and vegetables.    Psychotherapy  I agree to participate in Individual Therapy (if recommended).    Medication  If prescribed medications, I agree to take them.  Missing doses can result in serious side effects.  I  understand that drinking alcohol, or other illicit drug use, may cause potential side effects.  I will not stop my medication abruptly without first discussing it with my provider.    Staying Connected With Others  I will stay in touch with my friends, family members, and my primary care provider/team.    Use your imagination  Be creative.  We all have a creative side; it doesn t matter if it s oil painting, sand castles, or mud pies! This will also kick up the endorphins.    Witness Beauty  (AKA stop and smell the roses) Take a look outside, even in mid-winter. Notice colors, textures. Watch the squirrels and birds.     Service to others  Be of service to others.  There is always someone else in need.  By helping others we can  get out of ourselves  and remember the really important things.  This also provides opportunities for practicing all the other parts of the program.    Humor  Laugh and be silly!  Adjust your TV habits for less news and crime-drama and more comedy.    Control your stress  Try breathing deep, massage therapy, biofeedback, and meditation. Find time to relax each day.     My support system    Clinic Contact:  Phone number:    Contact 1:  Phone number:    Contact 2:  Phone number:    Tenriism/:  Phone number:    Therapist:  Phone number:    Local crisis center:    Phone number:    Other community support:  Phone number:

## 2018-06-20 NOTE — MR AVS SNAPSHOT
After Visit Summary   6/20/2018    Carleen Oates    MRN: [id number]           Patient Information     Date Of Birth          1990        Visit Information        Provider Department      6/20/2018 9:10 AM Pricila Moe PA-C [facility]        Today's Diagnoses     IUD check up    -  1    Major depressive disorder, recurrent episode, mild with anxious distress (H)        Generalized anxiety disorder           Follow-ups after your visit        Follow-up notes from your care team     Return in about 10 months (around 4/20/2019) for Physical Exam.      Who to contact     If you have questions or need follow up information about today's clinic visit or your schedule please contact [facility] directly at [phone].  Normal or non-critical lab and imaging results will be communicated to you by RoyaltySharehart, letter or phone within 4 business days after the clinic has received the results. If you do not hear from us within 7 days, please contact the clinic through RoyaltyShare[employer] or phone. If you have a critical or abnormal lab result, we will notify you by phone as soon as possible.  Submit refill requests through [email or web address] or call your pharmacy and they will forward the refill request to us. Please allow 3 business days for your refill to be completed.          Additional Information About Your Visit        MyChart Information     [email or web address] gives you secure access to your electronic health record. If you see a primary care provider, you can also send messages to your care team and make appointments. If you have questions, please call your primary care clinic.  If you do not have a primary care provider, please call [phone] and they will assist you.        Care EveryWhere ID     This is your Care EveryWhere ID. This could be used by other organizations to access your [facility] medical records  [id number]        Your Vitals Were     Pulse Temperature Respirations  "Height Last Period Pulse Oximetry    92 97.8  F (36.6  C) (Oral) 16 5' 5\" (1.651 m) 05/10/2018 (Approximate) 97%    Breastfeeding? BMI (Body Mass Index)                No 32.63 kg/m2           Blood Pressure from Last 3 Encounters:   06/20/18 124/74   05/07/18 122/84   04/17/18 118/78    Weight from Last 3 Encounters:   06/20/18 196 lb 1.6 oz (89 kg)   05/07/18 197 lb 8 oz (89.6 kg)   04/17/18 195 lb 8 oz (88.7 kg)              Today, you had the following     No orders found for display         Today's Medication Changes          These changes are accurate as of 6/20/18  9:13 AM.  If you have any questions, ask your nurse or doctor.               These medicines have changed or have updated prescriptions.        Dose/Directions    levonorgestrel 20 MCG/24HR IUD   Commonly known as:  MIRENA   This may have changed:  Another medication with the same name was removed. Continue taking this medication, and follow the directions you see here.   Used for:  IUD check up   Changed by:  Pricila Moe PA-C        Dose:  1 each   1 each (20 mcg) by Intrauterine route once for 1 dose   Quantity:  1 each   Refills:  0            Where to get your medicines      These medications were sent to [facility] [id number]  [address] - [facility]  AT [facility] & [address] [place] [address]     Phone:  [phone]     sertraline 100 MG tablet                Primary Care Provider Office Phone # Fax #    Pricila Moe PA-C [phone] [address]  [facility] [address]        Equal Access to Services     [facility]: Hadii raphael Moraes, wagillda luqadaha, qaybta kaalmada marie, sameer charles. So [facility] [phone].    ATENCIÓN: Si habla español, tiene a blount disposición servicios gratuitos de asistencia lingüística. Llame al [phone].    We comply with applicable federal civil rights laws and [place] " laws. We do not discriminate on the basis of race, color, national origin, age, disability, sex, sexual orientation, or gender identity.            Thank you!     Thank you for choosing [facility]  for your care. Our goal is always to provide you with excellent care. Hearing back from our patients is one way we can continue to improve our services. Please take a few minutes to complete the written survey that you may receive in the mail after your visit with us. Thank you!             Your Updated Medication List - Protect others around you: Learn how to safely use, store and throw away your medicines at www.disposemymeds.org.          This list is accurate as of 6/20/18  9:13 AM.  Always use your most recent med list.                   Brand Name Dispense Instructions for use Diagnosis    ALLEGRA PO      Take 180 mg by mouth daily        levonorgestrel 20 MCG/24HR IUD    MIRENA    1 each    1 each (20 mcg) by Intrauterine route once for 1 dose    IUD check up       sertraline 100 MG tablet    ZOLOFT    90 tablet    TAKE 1 TABLET BY MOUTH DAILY.    Major depressive disorder, recurrent episode, mild with anxious distress (H), Generalized anxiety disorder       TYLENOL 8 HOUR PO      Take  by mouth.        * varenicline 0.5 MG X 11 & 1 MG X 42 tablet    CHANTIX STARTING MONTH STEVO    53 tablet    Take 0.5 mg tab daily for 3 days, then 0.5 mg tab twice daily for 4 days, then 1 mg twice daily.    Tobacco abuse       * varenicline 1 MG tablet    CHANTIX    56 tablet    Take 1 tablet (1 mg) by mouth 2 times daily    Tobacco abuse       * Notice:  This list has 2 medication(s) that are the same as other medications prescribed for you. Read the directions carefully, and ask your doctor or other care provider to review them with you.
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
## 2018-06-21 ASSESSMENT — PATIENT HEALTH QUESTIONNAIRE - PHQ9: SUM OF ALL RESPONSES TO PHQ QUESTIONS 1-9: 3

## 2018-06-21 ASSESSMENT — ANXIETY QUESTIONNAIRES: GAD7 TOTAL SCORE: 4

## 2018-10-11 ENCOUNTER — MYC MEDICAL ADVICE (OUTPATIENT)
Dept: FAMILY MEDICINE | Facility: CLINIC | Age: 28
End: 2018-10-11

## 2018-10-12 NOTE — TELEPHONE ENCOUNTER
Form is completed and at  for patient to pick-up.  1st attempt, LM for pt to call back.  Radha Parry CMA ([facility])

## 2018-10-16 ENCOUNTER — MYC MEDICAL ADVICE (OUTPATIENT)
Dept: FAMILY MEDICINE | Facility: CLINIC | Age: 28
End: 2018-10-16

## 2018-10-23 NOTE — TELEPHONE ENCOUNTER
3rd attempt, LM for pt to call back.  Forms are still at  for pick-up.  Radha Parry CMA ([facility])

## 2019-03-02 ENCOUNTER — TRANSFERRED RECORDS (OUTPATIENT)
Dept: HEALTH INFORMATION MANAGEMENT | Facility: CLINIC | Age: 29
End: 2019-03-02

## 2019-05-07 ENCOUNTER — TELEPHONE (OUTPATIENT)
Dept: FAMILY MEDICINE | Facility: CLINIC | Age: 29
End: 2019-05-07

## 2019-05-07 DIAGNOSIS — R87.612 PAPANICOLAOU SMEAR OF CERVIX WITH LOW GRADE SQUAMOUS INTRAEPITHELIAL LESION (LGSIL): ICD-10-CM

## 2019-05-07 NOTE — TELEPHONE ENCOUNTER
Pt is past due for f/u pap smear.  [facility] clinic and schedule.    Deja Patnio  Pap Tracking

## 2019-07-13 DIAGNOSIS — F33.0 MAJOR DEPRESSIVE DISORDER, RECURRENT EPISODE, MILD WITH ANXIOUS DISTRESS (H): ICD-10-CM

## 2019-07-13 DIAGNOSIS — F41.1 GENERALIZED ANXIETY DISORDER: ICD-10-CM

## 2019-07-15 ENCOUNTER — MYC MEDICAL ADVICE (OUTPATIENT)
Dept: FAMILY MEDICINE | Facility: CLINIC | Age: 29
End: 2019-07-15

## 2019-07-15 ASSESSMENT — ANXIETY QUESTIONNAIRES
2. NOT BEING ABLE TO STOP OR CONTROL WORRYING: NOT AT ALL
GAD7 TOTAL SCORE: 0
7. FEELING AFRAID AS IF SOMETHING AWFUL MIGHT HAPPEN: NOT AT ALL
4. TROUBLE RELAXING: NOT AT ALL
5. BEING SO RESTLESS THAT IT IS HARD TO SIT STILL: NOT AT ALL
3. WORRYING TOO MUCH ABOUT DIFFERENT THINGS: NOT AT ALL
GAD7 TOTAL SCORE: 0
1. FEELING NERVOUS, ANXIOUS, OR ON EDGE: NOT AT ALL
7. FEELING AFRAID AS IF SOMETHING AWFUL MIGHT HAPPEN: NOT AT ALL
6. BECOMING EASILY ANNOYED OR IRRITABLE: NOT AT ALL
GAD7 TOTAL SCORE: 0

## 2019-07-15 ASSESSMENT — PATIENT HEALTH QUESTIONNAIRE - PHQ9
SUM OF ALL RESPONSES TO PHQ QUESTIONS 1-9: 2
10. IF YOU CHECKED OFF ANY PROBLEMS, HOW DIFFICULT HAVE THESE PROBLEMS MADE IT FOR YOU TO DO YOUR WORK, TAKE CARE OF THINGS AT HOME, OR GET ALONG WITH OTHER PEOPLE: SOMEWHAT DIFFICULT
SUM OF ALL RESPONSES TO PHQ QUESTIONS 1-9: 2

## 2019-07-15 NOTE — TELEPHONE ENCOUNTER
"Requested Prescriptions   Pending Prescriptions Disp Refills     sertraline (ZOLOFT) 100 MG tablet [Pharmacy Med Name: SERTRALINE 100MG TABLETS]  Last Written Prescription Date:  6/20/18  Last Fill Quantity: 90,  # refills: 3   Last office visit: 6/20/2018 with prescribing provider:  Pricila Moe PA-C   Future Office Visit:     90 tablet 0     Sig: TAKE 1 TABLET BY MOUTH DAILY       SSRIs Protocol Failed - 7/13/2019 11:14 AM        Failed - PHQ-9 score less than 5 in past 6 months     Please review last PHQ-9 score.   PHQ-9 SCORE 12/20/2016 9/19/2017 6/20/2018   PHQ-9 Total Score - - -   PHQ-9 Total Score 1 2 3     CLARIBEL-7 SCORE 11/15/2016 12/20/2016 6/20/2018   Total Score - - -   Total Score 0 0 4                 Failed - Recent (6 mo) or future (30 days) visit within the authorizing provider's specialty     Patient had office visit in the last 6 months or has a visit in the next 30 days with authorizing provider or within the authorizing provider's specialty.  See \"Patient Info\" tab in inbasket, or \"Choose Columns\" in Meds & Orders section of the refill encounter.            Passed - Medication is active on med list        Passed - Patient is age 18 or older        Passed - No active pregnancy on record        Passed - No positive pregnancy test in last 12 months          "

## 2019-07-15 NOTE — TELEPHONE ENCOUNTER
[employer] message sent to pt to schedule annual exam and update questionnaire.   Please monitor for pt receipt/response.    NICKO McdermottN, RN

## 2019-07-16 RX ORDER — SERTRALINE HYDROCHLORIDE 100 MG/1
TABLET, FILM COATED ORAL
Qty: 90 TABLET | Refills: 0 | Status: SHIPPED | OUTPATIENT
Start: 2019-07-16 | End: 2019-10-09

## 2019-07-16 ASSESSMENT — PATIENT HEALTH QUESTIONNAIRE - PHQ9: SUM OF ALL RESPONSES TO PHQ QUESTIONS 1-9: 2

## 2019-07-16 ASSESSMENT — ANXIETY QUESTIONNAIRES: GAD7 TOTAL SCORE: 0

## 2019-07-16 NOTE — TELEPHONE ENCOUNTER
Isadora refill given and message sent to pt to schedule annual exam before next refill is due.    PHQ-9 SCORE 9/19/2017 6/20/2018 7/15/2019   PHQ-9 Total Score - - -   PHQ-9 Total Score MyChart - - 2 (Minimal depression)   PHQ-9 Total Score 2 3 2     CLARIBEL-7 SCORE 12/20/2016 6/20/2018 7/15/2019   Total Score - - -   Total Score - - 0 (minimal anxiety)   Total Score 0 4 0     Tatyana Diego, NICKON, RN

## 2019-10-09 ENCOUNTER — MYC REFILL (OUTPATIENT)
Dept: FAMILY MEDICINE | Facility: CLINIC | Age: 29
End: 2019-10-09

## 2019-10-09 DIAGNOSIS — F33.0 MAJOR DEPRESSIVE DISORDER, RECURRENT EPISODE, MILD WITH ANXIOUS DISTRESS (H): ICD-10-CM

## 2019-10-09 DIAGNOSIS — F41.1 GENERALIZED ANXIETY DISORDER: ICD-10-CM

## 2019-10-09 RX ORDER — SERTRALINE HYDROCHLORIDE 100 MG/1
100 TABLET, FILM COATED ORAL DAILY
Qty: 30 TABLET | Refills: 0 | Status: SHIPPED | OUTPATIENT
Start: 2019-10-09 | End: 2019-11-04

## 2019-10-09 NOTE — TELEPHONE ENCOUNTER
"zoloft  LRF 7/16/19  LOV 6/20/18    Requested Prescriptions   Pending Prescriptions Disp Refills     sertraline (ZOLOFT) 100 MG tablet 90 tablet 0     Sig: Take 1 tablet (100 mg) by mouth daily       SSRIs Protocol Passed - 10/9/2019  1:51 PM        Passed - PHQ-9 score less than 5 in past 6 months     Please review last PHQ-9 score.           Passed - Medication is active on med list        Passed - Patient is age 18 or older        Passed - No active pregnancy on record        Passed - No positive pregnancy test in last 12 months        Passed - Recent (6 mo) or future (30 days) visit within the authorizing provider's specialty     Patient had office visit in the last 6 months or has a visit in the next 30 days with authorizing provider or within the authorizing provider's specialty.  See \"Patient Info\" tab in inbasket, or \"Choose Columns\" in Meds & Orders section of the refill encounter.            PHQ-9 SCORE 9/19/2017 6/20/2018 7/15/2019   PHQ-9 Total Score - - -   PHQ-9 Total Score MyChart - - 2 (Minimal depression)   PHQ-9 Total Score 2 3 2     Routing refill request to provider for review/approval because:  Patient needs to be seen because it has been more than 1 year since last office visit - was notified with July refill to make an appt, scheduled 11/4/19        "

## 2019-10-27 ENCOUNTER — HEALTH MAINTENANCE LETTER (OUTPATIENT)
Age: 29
End: 2019-10-27

## 2019-11-04 ENCOUNTER — OFFICE VISIT (OUTPATIENT)
Dept: FAMILY MEDICINE | Facility: CLINIC | Age: 29
End: 2019-11-04
Payer: COMMERCIAL

## 2019-11-04 VITALS
TEMPERATURE: 97 F | WEIGHT: 222 LBS | RESPIRATION RATE: 16 BRPM | BODY MASS INDEX: 36.99 KG/M2 | OXYGEN SATURATION: 97 % | HEART RATE: 90 BPM | SYSTOLIC BLOOD PRESSURE: 120 MMHG | HEIGHT: 65 IN | DIASTOLIC BLOOD PRESSURE: 70 MMHG

## 2019-11-04 DIAGNOSIS — Z00.00 ROUTINE GENERAL MEDICAL EXAMINATION AT A HEALTH CARE FACILITY: Primary | ICD-10-CM

## 2019-11-04 DIAGNOSIS — F41.1 GENERALIZED ANXIETY DISORDER: ICD-10-CM

## 2019-11-04 DIAGNOSIS — Z72.0 TOBACCO ABUSE: ICD-10-CM

## 2019-11-04 DIAGNOSIS — F33.0 MAJOR DEPRESSIVE DISORDER, RECURRENT EPISODE, MILD WITH ANXIOUS DISTRESS (H): ICD-10-CM

## 2019-11-04 DIAGNOSIS — R87.612 PAPANICOLAOU SMEAR OF CERVIX WITH LOW GRADE SQUAMOUS INTRAEPITHELIAL LESION (LGSIL): ICD-10-CM

## 2019-11-04 DIAGNOSIS — Z13.6 CARDIOVASCULAR SCREENING; LDL GOAL LESS THAN 160: ICD-10-CM

## 2019-11-04 PROCEDURE — 90686 IIV4 VACC NO PRSV 0.5 ML IM: CPT | Performed by: NURSE PRACTITIONER

## 2019-11-04 PROCEDURE — 99213 OFFICE O/P EST LOW 20 MIN: CPT | Mod: 25 | Performed by: NURSE PRACTITIONER

## 2019-11-04 PROCEDURE — 87624 HPV HI-RISK TYP POOLED RSLT: CPT | Performed by: NURSE PRACTITIONER

## 2019-11-04 PROCEDURE — 90471 IMMUNIZATION ADMIN: CPT | Performed by: NURSE PRACTITIONER

## 2019-11-04 PROCEDURE — G0145 SCR C/V CYTO,THINLAYER,RESCR: HCPCS | Performed by: NURSE PRACTITIONER

## 2019-11-04 PROCEDURE — 99395 PREV VISIT EST AGE 18-39: CPT | Mod: 25 | Performed by: NURSE PRACTITIONER

## 2019-11-04 RX ORDER — NICOTINE 21 MG/24HR
1 PATCH, TRANSDERMAL 24 HOURS TRANSDERMAL EVERY 24 HOURS
Qty: 28 PATCH | Refills: 3 | Status: SHIPPED | OUTPATIENT
Start: 2019-11-04 | End: 2021-03-08

## 2019-11-04 RX ORDER — SERTRALINE HYDROCHLORIDE 100 MG/1
100 TABLET, FILM COATED ORAL DAILY
Qty: 90 TABLET | Refills: 3 | Status: SHIPPED | OUTPATIENT
Start: 2019-11-04 | End: 2020-08-11

## 2019-11-04 ASSESSMENT — ENCOUNTER SYMPTOMS
HEARTBURN: 0
NERVOUS/ANXIOUS: 0
NAUSEA: 0
COUGH: 0
CHILLS: 0
PALPITATIONS: 0
SORE THROAT: 0
JOINT SWELLING: 0
BREAST MASS: 0
HEADACHES: 0
DIZZINESS: 0
MYALGIAS: 0
FREQUENCY: 0
DYSURIA: 0
HEMATURIA: 0
SHORTNESS OF BREATH: 0
ARTHRALGIAS: 0
ABDOMINAL PAIN: 0
DIARRHEA: 0
HEMATOCHEZIA: 0
PARESTHESIAS: 0
EYE PAIN: 0
FEVER: 0
WEAKNESS: 0
CONSTIPATION: 0

## 2019-11-04 ASSESSMENT — MIFFLIN-ST. JEOR: SCORE: 1718.9
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
## 2019-11-07 LAB
COPATH REPORT: NORMAL
PAP: NORMAL

## 2019-11-11 LAB
FINAL DIAGNOSIS: NORMAL
HPV HR 12 DNA CVX QL NAA+PROBE: NEGATIVE
HPV16 DNA SPEC QL NAA+PROBE: NEGATIVE
HPV18 DNA SPEC QL NAA+PROBE: NEGATIVE
SPECIMEN DESCRIPTION: NORMAL
SPECIMEN SOURCE CVX/VAG CYTO: NORMAL

## 2020-03-12 ENCOUNTER — OFFICE VISIT (OUTPATIENT)
Dept: FAMILY MEDICINE | Facility: CLINIC | Age: 30
End: 2020-03-12
Payer: COMMERCIAL

## 2020-03-12 VITALS
HEART RATE: 86 BPM | BODY MASS INDEX: 37.02 KG/M2 | RESPIRATION RATE: 17 BRPM | WEIGHT: 222.2 LBS | SYSTOLIC BLOOD PRESSURE: 130 MMHG | OXYGEN SATURATION: 96 % | DIASTOLIC BLOOD PRESSURE: 72 MMHG | HEIGHT: 65 IN | TEMPERATURE: 98.9 F

## 2020-03-12 DIAGNOSIS — H57.02 PUPIL ASYMMETRY: ICD-10-CM

## 2020-03-12 DIAGNOSIS — H57.11 EYE PAIN, RIGHT: Primary | ICD-10-CM

## 2020-03-12 DIAGNOSIS — H04.201 EYE TEARING, RIGHT: ICD-10-CM

## 2020-03-12 DIAGNOSIS — H53.141 PHOTOPHOBIA OF RIGHT EYE: ICD-10-CM

## 2020-03-12 PROCEDURE — 99215 OFFICE O/P EST HI 40 MIN: CPT | Performed by: PHYSICIAN ASSISTANT

## 2020-03-12 ASSESSMENT — ANXIETY QUESTIONNAIRES
IF YOU CHECKED OFF ANY PROBLEMS ON THIS QUESTIONNAIRE, HOW DIFFICULT HAVE THESE PROBLEMS MADE IT FOR YOU TO DO YOUR WORK, TAKE CARE OF THINGS AT HOME, OR GET ALONG WITH OTHER PEOPLE: NOT DIFFICULT AT ALL
3. WORRYING TOO MUCH ABOUT DIFFERENT THINGS: NOT AT ALL
1. FEELING NERVOUS, ANXIOUS, OR ON EDGE: NOT AT ALL
GAD7 TOTAL SCORE: 0
6. BECOMING EASILY ANNOYED OR IRRITABLE: NOT AT ALL
5. BEING SO RESTLESS THAT IT IS HARD TO SIT STILL: NOT AT ALL
7. FEELING AFRAID AS IF SOMETHING AWFUL MIGHT HAPPEN: NOT AT ALL
2. NOT BEING ABLE TO STOP OR CONTROL WORRYING: NOT AT ALL

## 2020-03-12 ASSESSMENT — PATIENT HEALTH QUESTIONNAIRE - PHQ9
SUM OF ALL RESPONSES TO PHQ QUESTIONS 1-9: 0
5. POOR APPETITE OR OVEREATING: NOT AT ALL

## 2020-03-12 ASSESSMENT — MIFFLIN-ST. JEOR: SCORE: 1719.8

## 2020-03-12 NOTE — NURSING NOTE
VISION   No corrective lenses  Tool used: Minesh   Right eye:        10/10 (20/20)  Left eye:          10/10 (20/20)  Visual Acuity: Pass  H Plus Lens Screening: Pass

## 2020-03-12 NOTE — PATIENT INSTRUCTIONS
I want you to have further evaluation tonight in the ER, likely see an eye doctor. Go to [facility] for further evaluation.
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
## 2020-03-13 ASSESSMENT — ANXIETY QUESTIONNAIRES: GAD7 TOTAL SCORE: 0

## 2020-07-20 DIAGNOSIS — F33.0 MAJOR DEPRESSIVE DISORDER, RECURRENT EPISODE, MILD WITH ANXIOUS DISTRESS (H): ICD-10-CM

## 2020-07-20 DIAGNOSIS — F41.1 GENERALIZED ANXIETY DISORDER: ICD-10-CM

## 2020-07-20 RX ORDER — SERTRALINE HYDROCHLORIDE 100 MG/1
TABLET, FILM COATED ORAL
Qty: 90 TABLET | Refills: 3 | OUTPATIENT
Start: 2020-07-20

## 2020-11-11 ENCOUNTER — E-VISIT (OUTPATIENT)
Dept: FAMILY MEDICINE | Facility: CLINIC | Age: 30
End: 2020-11-11
Payer: COMMERCIAL

## 2020-11-11 DIAGNOSIS — F41.1 GENERALIZED ANXIETY DISORDER: ICD-10-CM

## 2020-11-11 DIAGNOSIS — F33.0 MAJOR DEPRESSIVE DISORDER, RECURRENT EPISODE, MILD WITH ANXIOUS DISTRESS (H): Primary | ICD-10-CM

## 2020-11-11 PROCEDURE — 99421 OL DIG E/M SVC 5-10 MIN: CPT | Performed by: NURSE PRACTITIONER

## 2020-11-11 ASSESSMENT — PATIENT HEALTH QUESTIONNAIRE - PHQ9
SUM OF ALL RESPONSES TO PHQ QUESTIONS 1-9: 0
SUM OF ALL RESPONSES TO PHQ QUESTIONS 1-9: 0
10. IF YOU CHECKED OFF ANY PROBLEMS, HOW DIFFICULT HAVE THESE PROBLEMS MADE IT FOR YOU TO DO YOUR WORK, TAKE CARE OF THINGS AT HOME, OR GET ALONG WITH OTHER PEOPLE: NOT DIFFICULT AT ALL

## 2020-11-11 ASSESSMENT — ANXIETY QUESTIONNAIRES
GAD7 TOTAL SCORE: 3
2. NOT BEING ABLE TO STOP OR CONTROL WORRYING: NOT AT ALL
3. WORRYING TOO MUCH ABOUT DIFFERENT THINGS: SEVERAL DAYS
7. FEELING AFRAID AS IF SOMETHING AWFUL MIGHT HAPPEN: NOT AT ALL
7. FEELING AFRAID AS IF SOMETHING AWFUL MIGHT HAPPEN: NOT AT ALL
GAD7 TOTAL SCORE: 3
6. BECOMING EASILY ANNOYED OR IRRITABLE: SEVERAL DAYS
4. TROUBLE RELAXING: NOT AT ALL
5. BEING SO RESTLESS THAT IT IS HARD TO SIT STILL: NOT AT ALL
1. FEELING NERVOUS, ANXIOUS, OR ON EDGE: SEVERAL DAYS
GAD7 TOTAL SCORE: 3

## 2020-11-12 ASSESSMENT — PATIENT HEALTH QUESTIONNAIRE - PHQ9: SUM OF ALL RESPONSES TO PHQ QUESTIONS 1-9: 0

## 2020-11-12 ASSESSMENT — ANXIETY QUESTIONNAIRES: GAD7 TOTAL SCORE: 3

## 2020-11-18 RX ORDER — BUPROPION HYDROCHLORIDE 150 MG/1
150 TABLET ORAL EVERY MORNING
Qty: 90 TABLET | Refills: 0 | Status: SHIPPED | OUTPATIENT
Start: 2020-11-18 | End: 2021-03-08

## 2020-11-27 ENCOUNTER — TRANSFERRED RECORDS (OUTPATIENT)
Dept: HEALTH INFORMATION MANAGEMENT | Facility: CLINIC | Age: 30
End: 2020-11-27

## 2020-11-30 DIAGNOSIS — F41.1 GENERALIZED ANXIETY DISORDER: ICD-10-CM

## 2020-11-30 DIAGNOSIS — F33.0 MAJOR DEPRESSIVE DISORDER, RECURRENT EPISODE, MILD WITH ANXIOUS DISTRESS (H): ICD-10-CM

## 2020-12-01 RX ORDER — SERTRALINE HYDROCHLORIDE 100 MG/1
100 TABLET, FILM COATED ORAL DAILY
Qty: 90 TABLET | Refills: 0 | Status: SHIPPED | OUTPATIENT
Start: 2020-12-01 | End: 2021-03-08

## 2020-12-01 NOTE — TELEPHONE ENCOUNTER
Prescription approved per [facility] [place].  Left detailed message for patient.    Carissa Parks RN on 12/1/2020 at 2:22 PM

## 2020-12-01 NOTE — TELEPHONE ENCOUNTER
Pt called to check on the status of this request. She did a med ck e-visit with Kelsea in November but the new Rx was not sent to the pharmacy. Pt only has one pill left and is requesting this is sent in before end of the day.      Tatyana Lazar on 12/1/2020 at 12:37 PM

## 2021-01-10 ENCOUNTER — HEALTH MAINTENANCE LETTER (OUTPATIENT)
Age: 31
End: 2021-01-10

## 2021-03-08 ENCOUNTER — VIRTUAL VISIT (OUTPATIENT)
Dept: FAMILY MEDICINE | Facility: CLINIC | Age: 31
End: 2021-03-08
Payer: COMMERCIAL

## 2021-03-08 DIAGNOSIS — F41.1 GENERALIZED ANXIETY DISORDER: ICD-10-CM

## 2021-03-08 DIAGNOSIS — F33.0 MAJOR DEPRESSIVE DISORDER, RECURRENT EPISODE, MILD WITH ANXIOUS DISTRESS (H): ICD-10-CM

## 2021-03-08 PROCEDURE — 99213 OFFICE O/P EST LOW 20 MIN: CPT | Mod: 95 | Performed by: NURSE PRACTITIONER

## 2021-03-08 RX ORDER — BUPROPION HYDROCHLORIDE 150 MG/1
150 TABLET ORAL EVERY MORNING
Qty: 90 TABLET | Refills: 1 | Status: SHIPPED | OUTPATIENT
Start: 2021-03-08 | End: 2021-08-31

## 2021-03-08 RX ORDER — SERTRALINE HYDROCHLORIDE 100 MG/1
100 TABLET, FILM COATED ORAL DAILY
Qty: 90 TABLET | Refills: 1 | Status: SHIPPED | OUTPATIENT
Start: 2021-03-08 | End: 2021-08-31

## 2021-03-08 ASSESSMENT — ANXIETY QUESTIONNAIRES
2. NOT BEING ABLE TO STOP OR CONTROL WORRYING: NOT AT ALL
IF YOU CHECKED OFF ANY PROBLEMS ON THIS QUESTIONNAIRE, HOW DIFFICULT HAVE THESE PROBLEMS MADE IT FOR YOU TO DO YOUR WORK, TAKE CARE OF THINGS AT HOME, OR GET ALONG WITH OTHER PEOPLE: SOMEWHAT DIFFICULT
7. FEELING AFRAID AS IF SOMETHING AWFUL MIGHT HAPPEN: NOT AT ALL
6. BECOMING EASILY ANNOYED OR IRRITABLE: SEVERAL DAYS
1. FEELING NERVOUS, ANXIOUS, OR ON EDGE: SEVERAL DAYS
GAD7 TOTAL SCORE: 2
3. WORRYING TOO MUCH ABOUT DIFFERENT THINGS: NOT AT ALL
5. BEING SO RESTLESS THAT IT IS HARD TO SIT STILL: NOT AT ALL

## 2021-03-08 ASSESSMENT — PATIENT HEALTH QUESTIONNAIRE - PHQ9
SUM OF ALL RESPONSES TO PHQ QUESTIONS 1-9: 4
5. POOR APPETITE OR OVEREATING: NOT AT ALL

## 2021-03-08 NOTE — PROGRESS NOTES
"Carleen is a 30 year old who is being evaluated via a billable video visit.      How would you like to obtain your AVS? MyChart  If the video visit is dropped, the invitation should be resent by: Text to cell phone: [phone]  Will anyone else be joining your video visit? No    Video Start Time: 4:00pm    Assessment & Plan     Major depressive disorder, recurrent episode, mild with anxious distress (H)  Symptomatic, but stable.  Will restart therapy.  Desires no med change now.  Is going to work on making changes in her life to reduce stressors and symptoms.  Follow up in 3-6 months.  Pt agrees with plan and verbalized understanding.  - buPROPion (WELLBUTRIN XL) 150 MG 24 hr tablet; Take 1 tablet (150 mg) by mouth every morning  - sertraline (ZOLOFT) 100 MG tablet; Take 1 tablet (100 mg) by mouth daily  - MENTAL HEALTH REFERRAL  - Adult; Outpatient Treatment; Individual/Couples/Family/Group Therapy/Health Psychology; [facility]: [facility] [phone]; We will contact you to schedule the appointment or please call with any questions    Generalized anxiety disorder  See above.  - buPROPion (WELLBUTRIN XL) 150 MG 24 hr tablet; Take 1 tablet (150 mg) by mouth every morning  - sertraline (ZOLOFT) 100 MG tablet; Take 1 tablet (100 mg) by mouth daily  - MENTAL HEALTH REFERRAL  - Adult; Outpatient Treatment; Individual/Couples/Family/Group Therapy/Health Psychology; [facility]: [facility] [phone]; We will contact you to schedule the appointment or please call with any questions           Tobacco Cessation:   reports that she has been smoking cigarettes. She has been smoking about 0.50 packs per day. She has never used smokeless tobacco.      BMI:   Estimated body mass index is 37.26 kg/m  as calculated from the following:    Height as of 3/12/20: 1.645 m (5' 4.75\").    Weight as of 3/12/20: 100.8 kg (222 lb 3.2 oz).           No follow-ups on file.    LISETH Grewal Ra [facility] " [facility] KIMBERLEY Durant is a 30 year old who presents for the following health issues     HPI       Depression and Anxiety Follow-Up    How are you doing with your depression since your last visit? STABLE      How are you doing with your anxiety since your last visit?  No change    Are you having other symptoms that might be associated with depression or anxiety? No    Have you had a significant life event? No     Do you have any concerns with your use of alcohol or other drugs? No    Social History     Tobacco Use     Smoking status: Current Every Day Smoker     Packs/day: 0.50     Types: Cigarettes     Smokeless tobacco: Never Used     Tobacco comment: trying to quite 6/20/18   Substance Use Topics     Alcohol use: Yes     Alcohol/week: 0.0 standard drinks     Comment: Rare     Drug use: No     PHQ 3/12/2020 11/11/2020 3/8/2021   PHQ-9 Total Score 0 0 4   Q9: Thoughts of better off dead/self-harm past 2 weeks Not at all Not at all Not at all     CLARIBEL-7 SCORE 3/12/2020 11/11/2020 3/8/2021   Total Score - - -   Total Score - 3 (minimal anxiety) -   Total Score 0 3 2     Last PHQ-9 3/8/2021   1.  Little interest or pleasure in doing things 1   2.  Feeling down, depressed, or hopeless 1   3.  Trouble falling or staying asleep, or sleeping too much 0   4.  Feeling tired or having little energy 0   5.  Poor appetite or overeating 1   6.  Feeling bad about yourself 1   7.  Trouble concentrating 0   8.  Moving slowly or restless 0   Q9: Thoughts of better off dead/self-harm past 2 weeks 0   PHQ-9 Total Score 4   Difficulty at work, home, or with people Somewhat difficult     CLARIBEL-7  3/8/2021   1. Feeling nervous, anxious, or on edge 1   2. Not being able to stop or control worrying 0   3. Worrying too much about different things 0   4. Trouble relaxing 0   5. Being so restless that it is hard to sit still 0   6. Becoming easily annoyed or irritable 1   7. Feeling afraid, as if something awful might  happen 0   CLARIBEL-7 Total Score 2   If you checked any problems, how difficult have they made it for you to do your work, take care of things at home, or get along with other people? Somewhat difficult       Suicide Assessment Five-step Evaluation and Treatment (SAFE-T)      How many servings of fruits and vegetables do you eat daily?  2-3    On average, how many sweetened beverages do you drink each day (Examples: soda, juice, sweet tea, etc.  Do NOT count diet or artificially sweetened beverages)?   0    How many days per week do you exercise enough to make your heart beat faster? 0    How many minutes a day do you exercise enough to make your heart beat faster? 0    How many days per week do you miss taking your medication? 0    Symptoms increased.  Pt has had a lot of work stress.  She has concluded that she needs to make a change.  She does not believe there is a need for med change.  She is taking her meds, difficulty remembering to take the welllbutrin in the morning.  Misses weekend doses.  Believes this med has been intermittently helpful but difficult to tell with her stressors.  She is thinking about restarting therapy.    Review of Systems   Constitutional, HEENT, cardiovascular, pulmonary, gi and gu systems are negative, except as otherwise noted.      Objective           Vitals:  No vitals were obtained today due to virtual visit.    Physical Exam   GENERAL: Healthy, alert and no distress  EYES: Eyes grossly normal to inspection.  No discharge or erythema, or obvious scleral/conjunctival abnormalities.  RESP: No audible wheeze, cough, or visible cyanosis.  No visible retractions or increased work of breathing.    SKIN: Visible skin clear. No significant rash, abnormal pigmentation or lesions.  NEURO: Cranial nerves grossly intact.  Mentation and speech appropriate for age.  PSYCH: Mentation appears normal, affect normal/bright, judgement and insight intact, normal speech and appearance  well-groomed.                Video-Visit Details    Type of service:  Video Visit    Video End Time:4:08pm      Originating Location (pt. Location): Home    Distant Location (provider location):  [facility]     Platform used for Video Visit: Marta

## 2021-03-09 ASSESSMENT — ANXIETY QUESTIONNAIRES: GAD7 TOTAL SCORE: 2

## 2021-06-08 DIAGNOSIS — F33.0 MAJOR DEPRESSIVE DISORDER, RECURRENT EPISODE, MILD WITH ANXIOUS DISTRESS (H): ICD-10-CM

## 2021-06-08 DIAGNOSIS — F41.1 GENERALIZED ANXIETY DISORDER: ICD-10-CM

## 2021-06-10 RX ORDER — BUPROPION HYDROCHLORIDE 150 MG/1
TABLET ORAL
Qty: 90 TABLET | Refills: 1 | OUTPATIENT
Start: 2021-06-10

## 2021-06-10 RX ORDER — SERTRALINE HYDROCHLORIDE 100 MG/1
TABLET, FILM COATED ORAL
Qty: 90 TABLET | Refills: 1 | OUTPATIENT
Start: 2021-06-10

## 2021-08-31 ENCOUNTER — VIRTUAL VISIT (OUTPATIENT)
Dept: FAMILY MEDICINE | Facility: CLINIC | Age: 31
End: 2021-08-31
Payer: COMMERCIAL

## 2021-08-31 DIAGNOSIS — F33.0 MAJOR DEPRESSIVE DISORDER, RECURRENT EPISODE, MILD WITH ANXIOUS DISTRESS (H): Primary | ICD-10-CM

## 2021-08-31 DIAGNOSIS — F41.1 GENERALIZED ANXIETY DISORDER: ICD-10-CM

## 2021-08-31 PROCEDURE — 96127 BRIEF EMOTIONAL/BEHAV ASSMT: CPT | Mod: 95 | Performed by: NURSE PRACTITIONER

## 2021-08-31 PROCEDURE — 99214 OFFICE O/P EST MOD 30 MIN: CPT | Mod: TEL | Performed by: NURSE PRACTITIONER

## 2021-08-31 RX ORDER — BUPROPION HYDROCHLORIDE 150 MG/1
150 TABLET ORAL EVERY MORNING
Qty: 90 TABLET | Refills: 3 | Status: SHIPPED | OUTPATIENT
Start: 2021-08-31 | End: 2022-09-09

## 2021-08-31 RX ORDER — SERTRALINE HYDROCHLORIDE 100 MG/1
100 TABLET, FILM COATED ORAL DAILY
Qty: 90 TABLET | Refills: 3 | Status: SHIPPED | OUTPATIENT
Start: 2021-08-31 | End: 2022-09-09

## 2021-08-31 ASSESSMENT — ANXIETY QUESTIONNAIRES
6. BECOMING EASILY ANNOYED OR IRRITABLE: SEVERAL DAYS
2. NOT BEING ABLE TO STOP OR CONTROL WORRYING: NOT AT ALL
IF YOU CHECKED OFF ANY PROBLEMS ON THIS QUESTIONNAIRE, HOW DIFFICULT HAVE THESE PROBLEMS MADE IT FOR YOU TO DO YOUR WORK, TAKE CARE OF THINGS AT HOME, OR GET ALONG WITH OTHER PEOPLE: NOT DIFFICULT AT ALL
GAD7 TOTAL SCORE: 1
1. FEELING NERVOUS, ANXIOUS, OR ON EDGE: NOT AT ALL
5. BEING SO RESTLESS THAT IT IS HARD TO SIT STILL: NOT AT ALL
7. FEELING AFRAID AS IF SOMETHING AWFUL MIGHT HAPPEN: NOT AT ALL
3. WORRYING TOO MUCH ABOUT DIFFERENT THINGS: NOT AT ALL

## 2021-08-31 ASSESSMENT — PATIENT HEALTH QUESTIONNAIRE - PHQ9
5. POOR APPETITE OR OVEREATING: NOT AT ALL
SUM OF ALL RESPONSES TO PHQ QUESTIONS 1-9: 2

## 2021-08-31 NOTE — PROGRESS NOTES
Carleen is a 31 year old who is being evaluated via a billable video visit.      How would you like to obtain your AVS? MyChart  If the video visit is dropped, the invitation should be resent by: Text to cell phone: [phone]  Will anyone else be joining your video visit? No  {If patient encounters technical issues they should call [phone] :562021}    Video Start Time: {video visit start/end time for provider to select:936649}    {PROVIDER CHARTING PREFERENCE:522672}    Subjective   Carleen is a 31 year old who presents for the following health issues {ACCOMPANIED BY STATEMENT (Optional):348383}    HPI     Depression and Anxiety Follow-Up    How are you doing with your depression since your last visit? Improved     How are you doing with your anxiety since your last visit?  Improved     Are you having other symptoms that might be associated with depression or anxiety? No    Have you had a significant life event? No     Do you have any concerns with your use of alcohol or other drugs? No    Social History     Tobacco Use     Smoking status: Former Smoker     Packs/day: 0.50     Types: Cigarettes     Smokeless tobacco: Never Used     Tobacco comment: 1 year ago   Substance Use Topics     Alcohol use: Yes     Alcohol/week: 0.0 standard drinks     Comment: Rare     Drug use: No     PHQ 11/11/2020 3/8/2021 8/31/2021   PHQ-9 Total Score 0 4 2   Q9: Thoughts of better off dead/self-harm past 2 weeks Not at all Not at all Not at all     CLARIBEL-7 SCORE 11/11/2020 3/8/2021 8/31/2021   Total Score - - -   Total Score 3 (minimal anxiety) - -   Total Score 3 2 1     Last PHQ-9 8/31/2021   1.  Little interest or pleasure in doing things 1   2.  Feeling down, depressed, or hopeless 0   3.  Trouble falling or staying asleep, or sleeping too much 1   4.  Feeling tired or having little energy 0   5.  Poor appetite or overeating 0   6.  Feeling bad about yourself 0   7.  Trouble concentrating 0   8.  Moving slowly or restless 0   Q9:  "Thoughts of better off dead/self-harm past 2 weeks 0   PHQ-9 Total Score 2   Difficulty at work, home, or with people Not difficult at all     CLARIBEL-7  8/31/2021   1. Feeling nervous, anxious, or on edge 0   2. Not being able to stop or control worrying 0   3. Worrying too much about different things 0   4. Trouble relaxing 0   5. Being so restless that it is hard to sit still 0   6. Becoming easily annoyed or irritable 1   7. Feeling afraid, as if something awful might happen 0   CLARIBEL-7 Total Score 1   If you checked any problems, how difficult have they made it for you to do your work, take care of things at home, or get along with other people? Not difficult at all       Suicide Assessment Five-step Evaluation and Treatment (SAFE-T)  {Provider  Link to Depression Care Package SmartSet :874720}    How many servings of fruits and vegetables do you eat daily?  2-3    On average, how many sweetened beverages do you drink each day (Examples: soda, juice, sweet tea, etc.  Do NOT count diet or artificially sweetened beverages)?   0    How many days per week do you exercise enough to make your heart beat faster? 3 or less    How many minutes a day do you exercise enough to make your heart beat faster? 30 - 60    How many days per week do you miss taking your medication? 0    {additonal problems for provider to add (Optional):916576}    Review of Systems   {ROS COMP (Optional):346167}      Objective           Vitals:  No vitals were obtained today due to virtual visit.    Physical Exam   {video visit exam brief selected:456815::\"GENERAL: Healthy, alert and no distress\",\"EYES: Eyes grossly normal to inspection.  No discharge or erythema, or obvious scleral/conjunctival abnormalities.\",\"RESP: No audible wheeze, cough, or visible cyanosis.  No visible retractions or increased work of breathing.  \",\"SKIN: Visible skin clear. No significant rash, abnormal pigmentation or lesions.\",\"NEURO: Cranial nerves grossly intact.  " "Mentation and speech appropriate for age.\",\"PSYCH: Mentation appears normal, affect normal/bright, judgement and insight intact, normal speech and appearance well-groomed.\"}    {Diagnostic Test Results (Optional):395885}    {AMBULATORY ATTESTATION (Optional):684442}        Video-Visit Details    Type of service:  Video Visit    Video End Time:{video visit start/end time for provider to select:400371}    Originating Location (pt. Location): {video visit patient location:774020::\"Home\"}    Distant Location (provider location):  [facility]     Platform used for Video Visit: {Virtual Visit Platforms:402823::\"AmWell\"}  "

## 2021-08-31 NOTE — PROGRESS NOTES
Carleen is a 31 year old who is being evaluated via a billable telephone visit.      What phone number would you like to be contacted at? [phone]  How would you like to obtain your AVS? MyChart    Assessment & Plan     Major depressive disorder, recurrent episode, mild with anxious distress (H)  Improved.  Tolerating well.  Refilled.  - buPROPion (WELLBUTRIN XL) 150 MG 24 hr tablet; Take 1 tablet (150 mg) by mouth every morning  - sertraline (ZOLOFT) 100 MG tablet; Take 1 tablet (100 mg) by mouth daily    Generalized anxiety disorder  See above.  - buPROPion (WELLBUTRIN XL) 150 MG 24 hr tablet; Take 1 tablet (150 mg) by mouth every morning  - sertraline (ZOLOFT) 100 MG tablet; Take 1 tablet (100 mg) by mouth daily                       No follow-ups on file.    LISETH Grewal Ra, CNP  [facility] [place]    Subjective   Carleen is a 31 year old who presents for the following health issues     HPI     Depression and Anxiety Follow-Up    How are you doing with your depression since your last visit? Improved     How are you doing with your anxiety since your last visit?  Improved     Are you having other symptoms that might be associated with depression or anxiety? No    Have you had a significant life event? No     Do you have any concerns with your use of alcohol or other drugs? No    Social History     Tobacco Use     Smoking status: Former Smoker     Packs/day: 0.50     Types: Cigarettes     Smokeless tobacco: Never Used     Tobacco comment: 1 year ago   Substance Use Topics     Alcohol use: Yes     Alcohol/week: 0.0 standard drinks     Comment: Rare     Drug use: No     PHQ 11/11/2020 3/8/2021 8/31/2021   PHQ-9 Total Score 0 4 2   Q9: Thoughts of better off dead/self-harm past 2 weeks Not at all Not at all Not at all     CLARIBEL-7 SCORE 11/11/2020 3/8/2021 8/31/2021   Total Score - - -   Total Score 3 (minimal anxiety) - -   Total Score 3 2 1     Last PHQ-9 8/31/2021   1.  Little interest or  pleasure in doing things 1   2.  Feeling down, depressed, or hopeless 0   3.  Trouble falling or staying asleep, or sleeping too much 1   4.  Feeling tired or having little energy 0   5.  Poor appetite or overeating 0   6.  Feeling bad about yourself 0   7.  Trouble concentrating 0   8.  Moving slowly or restless 0   Q9: Thoughts of better off dead/self-harm past 2 weeks 0   PHQ-9 Total Score 2   Difficulty at work, home, or with people Not difficult at all     CLARIBEL-7  8/31/2021   1. Feeling nervous, anxious, or on edge 0   2. Not being able to stop or control worrying 0   3. Worrying too much about different things 0   4. Trouble relaxing 0   5. Being so restless that it is hard to sit still 0   6. Becoming easily annoyed or irritable 1   7. Feeling afraid, as if something awful might happen 0   CLARIBEL-7 Total Score 1   If you checked any problems, how difficult have they made it for you to do your work, take care of things at home, or get along with other people? Not difficult at all       Suicide Assessment Five-step Evaluation and Treatment (SAFE-T)      How many servings of fruits and vegetables do you eat daily?  2-3    On average, how many sweetened beverages do you drink each day (Examples: soda, juice, sweet tea, etc.  Do NOT count diet or artificially sweetened beverages)?   0    How many days per week do you exercise enough to make your heart beat faster? 3 or less    How many minutes a day do you exercise enough to make your heart beat faster? 30 - 60    How many days per week do you miss taking your medication? 0    Follow up mood.  Taking meds regularly.  Stressors improved, now working at a new job.  No concerns.  No change desired.    Review of Systems   Constitutional, HEENT, cardiovascular, pulmonary, gi and gu systems are negative, except as otherwise noted.      Objective           Vitals:  No vitals were obtained today due to virtual visit.    Physical Exam   healthy, alert and no distress  PSYCH:  Alert and oriented times 3; coherent speech, normal   rate and volume, able to articulate logical thoughts, able   to abstract reason, no tangential thoughts, no hallucinations   or delusions  Her affect is normal  RESP: No cough, no audible wheezing, able to talk in full sentences  Remainder of exam unable to be completed due to telephone visits                Phone call duration: 5 minutes

## 2021-09-01 ASSESSMENT — ANXIETY QUESTIONNAIRES: GAD7 TOTAL SCORE: 1

## 2021-10-23 ENCOUNTER — HEALTH MAINTENANCE LETTER (OUTPATIENT)
Age: 31
End: 2021-10-23

## 2021-10-28 ENCOUNTER — OFFICE VISIT (OUTPATIENT)
Dept: FAMILY MEDICINE | Facility: CLINIC | Age: 31
End: 2021-10-28
Payer: COMMERCIAL

## 2021-10-28 VITALS
BODY MASS INDEX: 36.6 KG/M2 | OXYGEN SATURATION: 97 % | RESPIRATION RATE: 18 BRPM | HEIGHT: 65 IN | WEIGHT: 219.7 LBS | HEART RATE: 83 BPM | SYSTOLIC BLOOD PRESSURE: 124 MMHG | DIASTOLIC BLOOD PRESSURE: 78 MMHG

## 2021-10-28 DIAGNOSIS — F33.0 MAJOR DEPRESSIVE DISORDER, RECURRENT EPISODE, MILD WITH ANXIOUS DISTRESS (H): ICD-10-CM

## 2021-10-28 DIAGNOSIS — Z11.59 NEED FOR HEPATITIS C SCREENING TEST: ICD-10-CM

## 2021-10-28 DIAGNOSIS — Z13.6 CARDIOVASCULAR SCREENING; LDL GOAL LESS THAN 160: ICD-10-CM

## 2021-10-28 DIAGNOSIS — M25.521 RIGHT ELBOW PAIN: Primary | ICD-10-CM

## 2021-10-28 DIAGNOSIS — F41.1 GENERALIZED ANXIETY DISORDER: ICD-10-CM

## 2021-10-28 DIAGNOSIS — Z00.00 ROUTINE GENERAL MEDICAL EXAMINATION AT A HEALTH CARE FACILITY: ICD-10-CM

## 2021-10-28 LAB — HCV AB SERPL QL IA: NONREACTIVE

## 2021-10-28 PROCEDURE — 99213 OFFICE O/P EST LOW 20 MIN: CPT | Mod: 25 | Performed by: NURSE PRACTITIONER

## 2021-10-28 PROCEDURE — 99395 PREV VISIT EST AGE 18-39: CPT | Mod: 25 | Performed by: NURSE PRACTITIONER

## 2021-10-28 PROCEDURE — 90471 IMMUNIZATION ADMIN: CPT | Performed by: NURSE PRACTITIONER

## 2021-10-28 PROCEDURE — 80053 COMPREHEN METABOLIC PANEL: CPT | Performed by: NURSE PRACTITIONER

## 2021-10-28 PROCEDURE — 80061 LIPID PANEL: CPT | Performed by: NURSE PRACTITIONER

## 2021-10-28 PROCEDURE — 36415 COLL VENOUS BLD VENIPUNCTURE: CPT | Performed by: NURSE PRACTITIONER

## 2021-10-28 PROCEDURE — 86803 HEPATITIS C AB TEST: CPT | Performed by: NURSE PRACTITIONER

## 2021-10-28 PROCEDURE — 90686 IIV4 VACC NO PRSV 0.5 ML IM: CPT | Performed by: NURSE PRACTITIONER

## 2021-10-28 ASSESSMENT — MIFFLIN-ST. JEOR: SCORE: 1698.46

## 2021-10-28 ASSESSMENT — ENCOUNTER SYMPTOMS
HEMATOCHEZIA: 0
JOINT SWELLING: 0
WEAKNESS: 0
EYE PAIN: 0
HEARTBURN: 0
DYSURIA: 0
MYALGIAS: 0
PALPITATIONS: 0
ARTHRALGIAS: 1
SHORTNESS OF BREATH: 0
FEVER: 0
CHILLS: 0
NAUSEA: 0
HEADACHES: 0
PARESTHESIAS: 0
SORE THROAT: 0
ABDOMINAL PAIN: 0
CONSTIPATION: 0
DIARRHEA: 0
HEMATURIA: 0
COUGH: 0
NERVOUS/ANXIOUS: 0
FREQUENCY: 0
BREAST MASS: 0
DIZZINESS: 0

## 2021-10-28 ASSESSMENT — PAIN SCALES - GENERAL: PAINLEVEL: MODERATE PAIN (4)
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
## 2021-10-29 ENCOUNTER — MYC MEDICAL ADVICE (OUTPATIENT)
Dept: FAMILY MEDICINE | Facility: CLINIC | Age: 31
End: 2021-10-29

## 2021-10-29 DIAGNOSIS — E66.812 CLASS 2 OBESITY WITHOUT SERIOUS COMORBIDITY WITH BODY MASS INDEX (BMI) OF 36.0 TO 36.9 IN ADULT, UNSPECIFIED OBESITY TYPE: Primary | ICD-10-CM

## 2021-10-29 LAB
ALBUMIN SERPL-MCNC: 3.8 G/DL (ref 3.4–5)
ALP SERPL-CCNC: 54 U/L (ref 40–150)
ALT SERPL W P-5'-P-CCNC: 16 U/L (ref 0–50)
ANION GAP SERPL CALCULATED.3IONS-SCNC: 8 MMOL/L (ref 3–14)
AST SERPL W P-5'-P-CCNC: 11 U/L (ref 0–45)
BILIRUB SERPL-MCNC: 0.3 MG/DL (ref 0.2–1.3)
BUN SERPL-MCNC: 11 MG/DL (ref 7–30)
CALCIUM SERPL-MCNC: 8.9 MG/DL (ref 8.5–10.1)
CHLORIDE BLD-SCNC: 110 MMOL/L (ref 94–109)
CHOLEST SERPL-MCNC: 233 MG/DL
CO2 SERPL-SCNC: 20 MMOL/L (ref 20–32)
CREAT SERPL-MCNC: 0.86 MG/DL (ref 0.52–1.04)
FASTING STATUS PATIENT QL REPORTED: YES
GFR SERPL CREATININE-BSD FRML MDRD: 89 ML/MIN/1.73M2
GLUCOSE BLD-MCNC: 114 MG/DL (ref 70–99)
HDLC SERPL-MCNC: 37 MG/DL
LDLC SERPL CALC-MCNC: 171 MG/DL
NONHDLC SERPL-MCNC: 196 MG/DL
POTASSIUM BLD-SCNC: 4.1 MMOL/L (ref 3.4–5.3)
PROT SERPL-MCNC: 7.3 G/DL (ref 6.8–8.8)
SODIUM SERPL-SCNC: 138 MMOL/L (ref 133–144)
TRIGL SERPL-MCNC: 123 MG/DL

## 2021-11-11 ENCOUNTER — IMMUNIZATION (OUTPATIENT)
Dept: NURSING | Facility: CLINIC | Age: 31
End: 2021-11-11
Payer: COMMERCIAL

## 2021-11-11 DIAGNOSIS — Z23 HIGH PRIORITY FOR 2019-NCOV VACCINE: Primary | ICD-10-CM

## 2021-11-11 PROCEDURE — 0004A COVID-19,PF,PFIZER (12+ YRS): CPT

## 2021-11-11 PROCEDURE — 99207 PR NO CHARGE LOS: CPT

## 2021-11-11 PROCEDURE — 91300 COVID-19,PF,PFIZER (12+ YRS): CPT

## 2021-12-05 DIAGNOSIS — F33.0 MAJOR DEPRESSIVE DISORDER, RECURRENT EPISODE, MILD WITH ANXIOUS DISTRESS (H): ICD-10-CM

## 2021-12-05 DIAGNOSIS — F41.1 GENERALIZED ANXIETY DISORDER: ICD-10-CM

## 2021-12-08 RX ORDER — SERTRALINE HYDROCHLORIDE 100 MG/1
TABLET, FILM COATED ORAL
Qty: 90 TABLET | Refills: 3 | OUTPATIENT
Start: 2021-12-08

## 2021-12-08 RX ORDER — BUPROPION HYDROCHLORIDE 150 MG/1
TABLET ORAL
Qty: 90 TABLET | Refills: 3 | OUTPATIENT
Start: 2021-12-08

## 2022-01-17 ENCOUNTER — HOSPITAL ENCOUNTER (OUTPATIENT)
Dept: NUTRITION | Facility: CLINIC | Age: 32
Discharge: HOME | End: 2022-01-17
Attending: NURSE PRACTITIONER | Admitting: NURSE PRACTITIONER
Payer: COMMERCIAL

## 2022-01-17 DIAGNOSIS — E66.812 CLASS 2 OBESITY WITHOUT SERIOUS COMORBIDITY WITH BODY MASS INDEX (BMI) OF 36.0 TO 36.9 IN ADULT, UNSPECIFIED OBESITY TYPE: ICD-10-CM

## 2022-01-17 PROCEDURE — 97802 MEDICAL NUTRITION INDIV IN: CPT | Mod: TEL,95
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
## 2022-04-28 ENCOUNTER — TRANSFERRED RECORDS (OUTPATIENT)
Dept: HEALTH INFORMATION MANAGEMENT | Facility: CLINIC | Age: 32
End: 2022-04-28
Payer: COMMERCIAL

## 2022-09-08 DIAGNOSIS — F41.1 GENERALIZED ANXIETY DISORDER: ICD-10-CM

## 2022-09-08 DIAGNOSIS — F33.0 MAJOR DEPRESSIVE DISORDER, RECURRENT EPISODE, MILD WITH ANXIOUS DISTRESS (H): ICD-10-CM

## 2022-09-09 RX ORDER — SERTRALINE HYDROCHLORIDE 100 MG/1
TABLET, FILM COATED ORAL
Qty: 90 TABLET | Refills: 0 | Status: SHIPPED | OUTPATIENT
Start: 2022-09-09 | End: 2022-12-20

## 2022-09-09 RX ORDER — BUPROPION HYDROCHLORIDE 150 MG/1
TABLET ORAL
Qty: 90 TABLET | Refills: 0 | Status: SHIPPED | OUTPATIENT
Start: 2022-09-09 | End: 2022-12-20

## 2022-09-09 NOTE — TELEPHONE ENCOUNTER
Patient has upcoming appointment. Prescription approved per [facility] Refill Protocol.  Kin MEDINA RN

## 2022-10-10 ENCOUNTER — HEALTH MAINTENANCE LETTER (OUTPATIENT)
Age: 32
End: 2022-10-10

## 2022-12-19 ASSESSMENT — ANXIETY QUESTIONNAIRES
3. WORRYING TOO MUCH ABOUT DIFFERENT THINGS: SEVERAL DAYS
4. TROUBLE RELAXING: NOT AT ALL
2. NOT BEING ABLE TO STOP OR CONTROL WORRYING: SEVERAL DAYS
7. FEELING AFRAID AS IF SOMETHING AWFUL MIGHT HAPPEN: SEVERAL DAYS
GAD7 TOTAL SCORE: 8
5. BEING SO RESTLESS THAT IT IS HARD TO SIT STILL: SEVERAL DAYS
GAD7 TOTAL SCORE: 8
6. BECOMING EASILY ANNOYED OR IRRITABLE: MORE THAN HALF THE DAYS
7. FEELING AFRAID AS IF SOMETHING AWFUL MIGHT HAPPEN: SEVERAL DAYS
8. IF YOU CHECKED OFF ANY PROBLEMS, HOW DIFFICULT HAVE THESE MADE IT FOR YOU TO DO YOUR WORK, TAKE CARE OF THINGS AT HOME, OR GET ALONG WITH OTHER PEOPLE?: SOMEWHAT DIFFICULT
IF YOU CHECKED OFF ANY PROBLEMS ON THIS QUESTIONNAIRE, HOW DIFFICULT HAVE THESE PROBLEMS MADE IT FOR YOU TO DO YOUR WORK, TAKE CARE OF THINGS AT HOME, OR GET ALONG WITH OTHER PEOPLE: SOMEWHAT DIFFICULT
1. FEELING NERVOUS, ANXIOUS, OR ON EDGE: MORE THAN HALF THE DAYS
GAD7 TOTAL SCORE: 8

## 2022-12-20 DIAGNOSIS — F41.1 GENERALIZED ANXIETY DISORDER: ICD-10-CM

## 2022-12-20 DIAGNOSIS — F33.0 MAJOR DEPRESSIVE DISORDER, RECURRENT EPISODE, MILD WITH ANXIOUS DISTRESS (H): ICD-10-CM

## 2022-12-20 RX ORDER — BUPROPION HYDROCHLORIDE 150 MG/1
TABLET ORAL
Qty: 90 TABLET | Refills: 0 | Status: SHIPPED | OUTPATIENT
Start: 2022-12-20 | End: 2023-03-20

## 2022-12-20 RX ORDER — SERTRALINE HYDROCHLORIDE 100 MG/1
TABLET, FILM COATED ORAL
Qty: 90 TABLET | Refills: 0 | Status: SHIPPED | OUTPATIENT
Start: 2022-12-20 | End: 2023-03-20

## 2022-12-20 NOTE — TELEPHONE ENCOUNTER
Routing refill request to provider for review/approval because:  Isadora given x1 and patient did not follow up, please advise  Patient needs to be seen because:  due for a 6 month follow up  Outdated PHQ9    PHQ-9 score:    PHQ 8/31/2021   PHQ-9 Total Score 2   Q9: Thoughts of better off dead/self-harm past 2 weeks Not at all     Aleksandra Collins RN on 12/20/2022 at 8:41 AM

## 2022-12-23 ENCOUNTER — VIRTUAL VISIT (OUTPATIENT)
Dept: FAMILY MEDICINE | Facility: CLINIC | Age: 32
End: 2022-12-23
Payer: COMMERCIAL

## 2022-12-23 DIAGNOSIS — Z53.9 ERRONEOUS ENCOUNTER--DISREGARD: Primary | ICD-10-CM

## 2022-12-23 NOTE — PROGRESS NOTES
Doesn't need seen. Has refill for  90 days; needed appt in person with pcp.       Kamilah Ferguson, FNP-BC

## 2023-02-18 ENCOUNTER — HEALTH MAINTENANCE LETTER (OUTPATIENT)
Age: 33
End: 2023-02-18

## 2023-03-18 DIAGNOSIS — F41.1 GENERALIZED ANXIETY DISORDER: ICD-10-CM

## 2023-03-18 DIAGNOSIS — F33.0 MAJOR DEPRESSIVE DISORDER, RECURRENT EPISODE, MILD WITH ANXIOUS DISTRESS (H): ICD-10-CM

## 2023-03-20 RX ORDER — SERTRALINE HYDROCHLORIDE 100 MG/1
TABLET, FILM COATED ORAL
Qty: 90 TABLET | Refills: 0 | Status: SHIPPED | OUTPATIENT
Start: 2023-03-20 | End: 2023-04-10

## 2023-03-20 RX ORDER — BUPROPION HYDROCHLORIDE 150 MG/1
TABLET ORAL
Qty: 90 TABLET | Refills: 0 | Status: SHIPPED | OUTPATIENT
Start: 2023-03-20 | End: 2023-04-10

## 2023-03-20 NOTE — TELEPHONE ENCOUNTER
Last refill until visit   Prescription approved per [facility] Refill Protocol.  Patti Kerns RN, BSN  [facility]

## 2023-04-10 ENCOUNTER — OFFICE VISIT (OUTPATIENT)
Dept: FAMILY MEDICINE | Facility: CLINIC | Age: 33
End: 2023-04-10
Payer: COMMERCIAL

## 2023-04-10 VITALS
TEMPERATURE: 98.6 F | WEIGHT: 236 LBS | HEIGHT: 65 IN | RESPIRATION RATE: 16 BRPM | DIASTOLIC BLOOD PRESSURE: 79 MMHG | SYSTOLIC BLOOD PRESSURE: 115 MMHG | HEART RATE: 62 BPM | BODY MASS INDEX: 39.32 KG/M2 | OXYGEN SATURATION: 98 %

## 2023-04-10 DIAGNOSIS — Z00.00 ROUTINE GENERAL MEDICAL EXAMINATION AT A HEALTH CARE FACILITY: ICD-10-CM

## 2023-04-10 DIAGNOSIS — Z12.4 CERVICAL CANCER SCREENING: ICD-10-CM

## 2023-04-10 DIAGNOSIS — Z13.6 CARDIOVASCULAR SCREENING; LDL GOAL LESS THAN 160: ICD-10-CM

## 2023-04-10 DIAGNOSIS — F41.1 GENERALIZED ANXIETY DISORDER: ICD-10-CM

## 2023-04-10 DIAGNOSIS — R00.2 PALPITATIONS: Primary | ICD-10-CM

## 2023-04-10 DIAGNOSIS — R06.83 SNORING: ICD-10-CM

## 2023-04-10 DIAGNOSIS — F33.0 MAJOR DEPRESSIVE DISORDER, RECURRENT EPISODE, MILD WITH ANXIOUS DISTRESS (H): ICD-10-CM

## 2023-04-10 LAB
ALBUMIN SERPL BCG-MCNC: 4.2 G/DL (ref 3.5–5.2)
ALP SERPL-CCNC: 62 U/L (ref 35–104)
ALT SERPL W P-5'-P-CCNC: 16 U/L (ref 10–35)
ANION GAP SERPL CALCULATED.3IONS-SCNC: 14 MMOL/L (ref 7–15)
AST SERPL W P-5'-P-CCNC: 24 U/L (ref 10–35)
BILIRUB SERPL-MCNC: 0.3 MG/DL
BUN SERPL-MCNC: 9.4 MG/DL (ref 6–20)
CALCIUM SERPL-MCNC: 9.1 MG/DL (ref 8.6–10)
CHLORIDE SERPL-SCNC: 105 MMOL/L (ref 98–107)
CHOLEST SERPL-MCNC: 203 MG/DL
CREAT SERPL-MCNC: 0.91 MG/DL (ref 0.51–0.95)
DEPRECATED HCO3 PLAS-SCNC: 20 MMOL/L (ref 22–29)
ERYTHROCYTE [DISTWIDTH] IN BLOOD BY AUTOMATED COUNT: 12.4 % (ref 10–15)
GFR SERPL CREATININE-BSD FRML MDRD: 84 ML/MIN/1.73M2
GLUCOSE SERPL-MCNC: 100 MG/DL (ref 70–99)
HCT VFR BLD AUTO: 37.1 % (ref 35–47)
HDLC SERPL-MCNC: 35 MG/DL
HGB BLD-MCNC: 12.5 G/DL (ref 11.7–15.7)
LDLC SERPL CALC-MCNC: 136 MG/DL
MCH RBC QN AUTO: 30.9 PG (ref 26.5–33)
MCHC RBC AUTO-ENTMCNC: 33.7 G/DL (ref 31.5–36.5)
MCV RBC AUTO: 92 FL (ref 78–100)
NONHDLC SERPL-MCNC: 168 MG/DL
PLATELET # BLD AUTO: 311 10E3/UL (ref 150–450)
POTASSIUM SERPL-SCNC: 4 MMOL/L (ref 3.4–5.3)
PROT SERPL-MCNC: 7.1 G/DL (ref 6.4–8.3)
RBC # BLD AUTO: 4.04 10E6/UL (ref 3.8–5.2)
SODIUM SERPL-SCNC: 139 MMOL/L (ref 136–145)
TRIGL SERPL-MCNC: 161 MG/DL
TSH SERPL DL<=0.005 MIU/L-ACNC: 1.37 UIU/ML (ref 0.3–4.2)
WBC # BLD AUTO: 6.6 10E3/UL (ref 4–11)

## 2023-04-10 PROCEDURE — 99213 OFFICE O/P EST LOW 20 MIN: CPT | Mod: 25 | Performed by: NURSE PRACTITIONER

## 2023-04-10 PROCEDURE — 80053 COMPREHEN METABOLIC PANEL: CPT | Performed by: NURSE PRACTITIONER

## 2023-04-10 PROCEDURE — 87624 HPV HI-RISK TYP POOLED RSLT: CPT | Performed by: NURSE PRACTITIONER

## 2023-04-10 PROCEDURE — 84443 ASSAY THYROID STIM HORMONE: CPT | Performed by: NURSE PRACTITIONER

## 2023-04-10 PROCEDURE — 90715 TDAP VACCINE 7 YRS/> IM: CPT | Performed by: NURSE PRACTITIONER

## 2023-04-10 PROCEDURE — 99395 PREV VISIT EST AGE 18-39: CPT | Mod: 25 | Performed by: NURSE PRACTITIONER

## 2023-04-10 PROCEDURE — 90471 IMMUNIZATION ADMIN: CPT | Performed by: NURSE PRACTITIONER

## 2023-04-10 PROCEDURE — G0145 SCR C/V CYTO,THINLAYER,RESCR: HCPCS | Performed by: NURSE PRACTITIONER

## 2023-04-10 PROCEDURE — 85027 COMPLETE CBC AUTOMATED: CPT | Performed by: NURSE PRACTITIONER

## 2023-04-10 PROCEDURE — 80061 LIPID PANEL: CPT | Performed by: NURSE PRACTITIONER

## 2023-04-10 PROCEDURE — 36415 COLL VENOUS BLD VENIPUNCTURE: CPT | Performed by: NURSE PRACTITIONER

## 2023-04-10 RX ORDER — BUPROPION HYDROCHLORIDE 150 MG/1
150 TABLET ORAL EVERY MORNING
Qty: 90 TABLET | Refills: 3 | Status: SHIPPED | OUTPATIENT
Start: 2023-04-10 | End: 2024-01-26

## 2023-04-10 RX ORDER — SERTRALINE HYDROCHLORIDE 100 MG/1
100 TABLET, FILM COATED ORAL DAILY
Qty: 90 TABLET | Refills: 3 | Status: SHIPPED | OUTPATIENT
Start: 2023-04-10 | End: 2023-10-24

## 2023-04-10 ASSESSMENT — ENCOUNTER SYMPTOMS
EYE PAIN: 0
MYALGIAS: 0
CONSTIPATION: 0
HEADACHES: 0
HEARTBURN: 0
FREQUENCY: 0
DYSURIA: 0
HEMATURIA: 0
WEAKNESS: 0
SORE THROAT: 0
PALPITATIONS: 1
DIARRHEA: 0
CHILLS: 0
SHORTNESS OF BREATH: 0
NERVOUS/ANXIOUS: 1
BREAST MASS: 0
JOINT SWELLING: 0
COUGH: 0
PARESTHESIAS: 0
DIZZINESS: 0
ABDOMINAL PAIN: 0
HEMATOCHEZIA: 0
FEVER: 0
NAUSEA: 0
ARTHRALGIAS: 0

## 2023-04-10 ASSESSMENT — PATIENT HEALTH QUESTIONNAIRE - PHQ9
10. IF YOU CHECKED OFF ANY PROBLEMS, HOW DIFFICULT HAVE THESE PROBLEMS MADE IT FOR YOU TO DO YOUR WORK, TAKE CARE OF THINGS AT HOME, OR GET ALONG WITH OTHER PEOPLE: SOMEWHAT DIFFICULT
SUM OF ALL RESPONSES TO PHQ QUESTIONS 1-9: 5
SUM OF ALL RESPONSES TO PHQ QUESTIONS 1-9: 5

## 2023-04-10 ASSESSMENT — PAIN SCALES - GENERAL: PAINLEVEL: NO PAIN (0)
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
## 2023-04-13 LAB
BKR LAB AP GYN ADEQUACY: NORMAL
BKR LAB AP GYN INTERPRETATION: NORMAL
BKR LAB AP HPV REFLEX: NORMAL
BKR LAB AP PREVIOUS ABNORMAL: NORMAL
PATH REPORT.COMMENTS IMP SPEC: NORMAL
PATH REPORT.COMMENTS IMP SPEC: NORMAL
PATH REPORT.RELEVANT HX SPEC: NORMAL

## 2023-04-16 LAB
HUMAN PAPILLOMA VIRUS 16 DNA: NEGATIVE
HUMAN PAPILLOMA VIRUS 18 DNA: NEGATIVE
HUMAN PAPILLOMA VIRUS FINAL DIAGNOSIS: NORMAL
HUMAN PAPILLOMA VIRUS OTHER HR: NEGATIVE

## 2023-10-18 ASSESSMENT — ANXIETY QUESTIONNAIRES
GAD7 TOTAL SCORE: 9
3. WORRYING TOO MUCH ABOUT DIFFERENT THINGS: SEVERAL DAYS
IF YOU CHECKED OFF ANY PROBLEMS ON THIS QUESTIONNAIRE, HOW DIFFICULT HAVE THESE PROBLEMS MADE IT FOR YOU TO DO YOUR WORK, TAKE CARE OF THINGS AT HOME, OR GET ALONG WITH OTHER PEOPLE: VERY DIFFICULT
1. FEELING NERVOUS, ANXIOUS, OR ON EDGE: SEVERAL DAYS
2. NOT BEING ABLE TO STOP OR CONTROL WORRYING: SEVERAL DAYS
7. FEELING AFRAID AS IF SOMETHING AWFUL MIGHT HAPPEN: SEVERAL DAYS
6. BECOMING EASILY ANNOYED OR IRRITABLE: NEARLY EVERY DAY
5. BEING SO RESTLESS THAT IT IS HARD TO SIT STILL: SEVERAL DAYS
4. TROUBLE RELAXING: SEVERAL DAYS
GAD7 TOTAL SCORE: 9

## 2023-10-24 ENCOUNTER — VIRTUAL VISIT (OUTPATIENT)
Dept: FAMILY MEDICINE | Facility: CLINIC | Age: 33
End: 2023-10-24
Payer: COMMERCIAL

## 2023-10-24 DIAGNOSIS — F41.1 GENERALIZED ANXIETY DISORDER: Primary | ICD-10-CM

## 2023-10-24 PROCEDURE — 99214 OFFICE O/P EST MOD 30 MIN: CPT | Mod: VID | Performed by: NURSE PRACTITIONER

## 2023-10-24 RX ORDER — SERTRALINE HYDROCHLORIDE 100 MG/1
150 TABLET, FILM COATED ORAL DAILY
Qty: 135 TABLET | Refills: 0 | Status: SHIPPED | OUTPATIENT
Start: 2023-10-24 | End: 2024-01-26

## 2023-10-24 ASSESSMENT — PATIENT HEALTH QUESTIONNAIRE - PHQ9
10. IF YOU CHECKED OFF ANY PROBLEMS, HOW DIFFICULT HAVE THESE PROBLEMS MADE IT FOR YOU TO DO YOUR WORK, TAKE CARE OF THINGS AT HOME, OR GET ALONG WITH OTHER PEOPLE: VERY DIFFICULT
SUM OF ALL RESPONSES TO PHQ QUESTIONS 1-9: 7
SUM OF ALL RESPONSES TO PHQ QUESTIONS 1-9: 7

## 2023-10-24 NOTE — PROGRESS NOTES
"Carleen is a 33 year old who is being evaluated via a billable video visit.      How would you like to obtain your AVS? MyChart  If the video visit is dropped, the invitation should be resent by: Text to cell phone: [phone]  Will anyone else be joining your video visit? No          Assessment & Plan     Generalized anxiety disorder  Increased symptoms.  Open to therapy.  Also increase dose of sertraline.  Follow up in 4 weeks.  - Adult Mental Health  Referral; Future  - sertraline (ZOLOFT) 100 MG tablet; Take 1.5 tablets (150 mg) by mouth daily  - Adult Mental Health  Referral; Future             BMI:   Estimated body mass index is 39.58 kg/m  as calculated from the following:    Height as of 4/10/23: 1.645 m (5' 4.75\").    Weight as of 4/10/23: 107 kg (236 lb).           LISETH Grewal Ra, CNP  [facility] [place]    Subjective   Carleen is a 33 year old, presenting for the following health issues:  Recheck Medication      10/24/2023     1:05 PM   Additional Questions   Roomed by radha avitia   Accompanied by self         10/24/2023     1:05 PM   Patient Reported Additional Medications   Patient reports taking the following new medications na       History of Present Illness       Mental Health Follow-up:  Patient presents to follow-up on Depression & Anxiety.Patient's depression since last visit has been:  Medium  The patient is not having other symptoms associated with depression.  Patient's anxiety since last visit has been:  Worse  The patient is having other symptoms associated with anxiety.  Any significant life events: financial concerns and other  Patient is feeling anxious or having panic attacks.  Patient has no concerns about alcohol or drug use.    She eats 2-3 servings of fruits and vegetables daily.She consumes 1 sweetened beverage(s) daily.She exercises with enough effort to increase her heart rate 10 to 19 minutes per day.  She exercises with enough effort to " increase her heart rate 3 or less days per week.   She is taking medications regularly.         Increased anxiety symptoms over the past 3+ months.  No specific trigger, although there have been changes in her life.  She is unsure if these changes are contributing.  Feeling more anxious about activities that didn't bother her in the past.  She is having some issues with nighttime waking.  She is taking her medications as directed.  Tolerating well.  She has utilized therapy in the past for depression with good results.      Review of Systems   Constitutional, HEENT, cardiovascular, pulmonary, gi and gu systems are negative, except as otherwise noted.      Objective           Vitals:  No vitals were obtained today due to virtual visit.    Physical Exam   GENERAL: Healthy, alert and no distress  EYES: Eyes grossly normal to inspection.  No discharge or erythema, or obvious scleral/conjunctival abnormalities.  RESP: No audible wheeze, cough, or visible cyanosis.  No visible retractions or increased work of breathing.    SKIN: Visible skin clear. No significant rash, abnormal pigmentation or lesions.  NEURO: Cranial nerves grossly intact.  Mentation and speech appropriate for age.  PSYCH: Mentation appears normal, affect normal/bright, judgement and insight intact, normal speech and appearance well-groomed.                Video-Visit Details    Type of service:  Video Visit     Originating Location (pt. Location): Home    Distant Location (provider location):  Off-site  Platform used for Video Visit: [employer]

## 2023-11-01 ENCOUNTER — TRANSFERRED RECORDS (OUTPATIENT)
Dept: HEALTH INFORMATION MANAGEMENT | Facility: CLINIC | Age: 33
End: 2023-11-01
Payer: COMMERCIAL

## 2023-12-04 ASSESSMENT — PATIENT HEALTH QUESTIONNAIRE - PHQ9
SUM OF ALL RESPONSES TO PHQ QUESTIONS 1-9: 0
10. IF YOU CHECKED OFF ANY PROBLEMS, HOW DIFFICULT HAVE THESE PROBLEMS MADE IT FOR YOU TO DO YOUR WORK, TAKE CARE OF THINGS AT HOME, OR GET ALONG WITH OTHER PEOPLE: NOT DIFFICULT AT ALL
SUM OF ALL RESPONSES TO PHQ QUESTIONS 1-9: 0

## 2023-12-05 ENCOUNTER — OFFICE VISIT (OUTPATIENT)
Dept: FAMILY MEDICINE | Facility: CLINIC | Age: 33
End: 2023-12-05
Payer: COMMERCIAL

## 2023-12-05 VITALS
OXYGEN SATURATION: 95 % | BODY MASS INDEX: 38.72 KG/M2 | HEIGHT: 65 IN | SYSTOLIC BLOOD PRESSURE: 131 MMHG | HEART RATE: 85 BPM | DIASTOLIC BLOOD PRESSURE: 82 MMHG | WEIGHT: 232.4 LBS | TEMPERATURE: 98.5 F

## 2023-12-05 DIAGNOSIS — J02.9 SORE THROAT: Primary | ICD-10-CM

## 2023-12-05 LAB
BASOPHILS # BLD AUTO: 0 10E3/UL (ref 0–0.2)
BASOPHILS NFR BLD AUTO: 0 %
DEPRECATED S PYO AG THROAT QL EIA: NEGATIVE
EOSINOPHIL # BLD AUTO: 0.1 10E3/UL (ref 0–0.7)
EOSINOPHIL NFR BLD AUTO: 1 %
ERYTHROCYTE [DISTWIDTH] IN BLOOD BY AUTOMATED COUNT: 12.7 % (ref 10–15)
GROUP A STREP BY PCR: NOT DETECTED
HCT VFR BLD AUTO: 36.4 % (ref 35–47)
HGB BLD-MCNC: 12.3 G/DL (ref 11.7–15.7)
IMM GRANULOCYTES # BLD: 0 10E3/UL
IMM GRANULOCYTES NFR BLD: 0 %
LYMPHOCYTES # BLD AUTO: 1.6 10E3/UL (ref 0.8–5.3)
LYMPHOCYTES NFR BLD AUTO: 25 %
MCH RBC QN AUTO: 31.2 PG (ref 26.5–33)
MCHC RBC AUTO-ENTMCNC: 33.8 G/DL (ref 31.5–36.5)
MCV RBC AUTO: 92 FL (ref 78–100)
MONOCYTES # BLD AUTO: 0.4 10E3/UL (ref 0–1.3)
MONOCYTES NFR BLD AUTO: 6 %
MONOCYTES NFR BLD AUTO: NEGATIVE %
NEUTROPHILS # BLD AUTO: 4.4 10E3/UL (ref 1.6–8.3)
NEUTROPHILS NFR BLD AUTO: 68 %
PLATELET # BLD AUTO: 269 10E3/UL (ref 150–450)
RBC # BLD AUTO: 3.94 10E6/UL (ref 3.8–5.2)
WBC # BLD AUTO: 6.5 10E3/UL (ref 4–11)

## 2023-12-05 PROCEDURE — 85025 COMPLETE CBC W/AUTO DIFF WBC: CPT | Performed by: PHYSICIAN ASSISTANT

## 2023-12-05 PROCEDURE — 87651 STREP A DNA AMP PROBE: CPT | Performed by: PHYSICIAN ASSISTANT

## 2023-12-05 PROCEDURE — 36415 COLL VENOUS BLD VENIPUNCTURE: CPT | Performed by: PHYSICIAN ASSISTANT

## 2023-12-05 PROCEDURE — 86308 HETEROPHILE ANTIBODY SCREEN: CPT | Performed by: PHYSICIAN ASSISTANT

## 2023-12-05 PROCEDURE — 99214 OFFICE O/P EST MOD 30 MIN: CPT | Performed by: PHYSICIAN ASSISTANT

## 2023-12-05 RX ORDER — LIDOCAINE HYDROCHLORIDE 20 MG/ML
15 SOLUTION OROPHARYNGEAL
Qty: 200 ML | Refills: 0 | Status: SHIPPED | OUTPATIENT
Start: 2023-12-05

## 2023-12-05 NOTE — PROGRESS NOTES
Assessment & Plan     Sore throat    Likely viral since all testing is normal. Recommend lidocaine as needed for throat pain. Follow-up if not improving in 1-2 weeks or sooner if worsening.    - Streptococcus A Rapid Screen w/Reflex to PCR - Clinic Collect  - Mononucleosis screen; Future  - CBC with platelets and differential; Future  - Mononucleosis screen  - CBC with platelets and differential  - Group A Streptococcus PCR Throat Swab  - lidocaine, viscous, (XYLOCAINE) 2 % solution; Swish and spit 15 mLs in mouth every 3 hours as needed for pain ; Max 8 doses/24 hour period.                   MARTHA [facility]    Stewart Durant is a 33 year old, presenting for the following health issues:  No chief complaint on file.        12/5/2023     7:40 AM   Additional Questions   Roomed by Ayla Pandey       History of Present Illness       Reason for visit:  Sore throat  Symptom onset:  1-3 days ago  Symptoms include:  Sore throat, neck pain, body aches, tiredness  Symptom intensity:  Severe  Symptom progression:  Worsening  Had these symptoms before:  Yes  Has tried/received treatment for these symptoms:  Yes  Previous treatment was successful:  No  What makes it worse:  Swallowing  What makes it better:  Not anymore    She eats 2-3 servings of fruits and vegetables daily.She consumes 1 sweetened beverage(s) daily.She exercises with enough effort to increase her heart rate 10 to 19 minutes per day.  She exercises with enough effort to increase her heart rate 3 or less days per week.   She is taking medications regularly.         Acute Illness  Onset/Duration: 12/2/2023  Symptoms:  Fever: No  Chills/Sweats: No  Headache (location?): YES  Sinus Pressure: No  Conjunctivitis:  No  Ear Pain: YES: right  Rhinorrhea: No  Congestion: No  Sore Throat: YES- with painful swallowing  Cough: no  Wheeze: No  Decreased Appetite: YES  Nausea: No  Vomiting: No  Diarrhea: No  Dysuria/Freq.:  "No  Dysuria or Hematuria: No  Fatigue/Achiness: YES  Sick/Strep Exposure: No  Therapies tried and outcome: Ibuprofen and Excedrin with no relief        Review of Systems   Constitutional, HEENT, cardiovascular, pulmonary, gi and gu systems are negative, except as otherwise noted.        Objective    /82 (BP Location: Right arm, Patient Position: Sitting, Cuff Size: Adult Large)   Pulse 85   Temp 98.5  F (36.9  C) (Oral)   Ht 1.651 m (5' 5\")   Wt 105.4 kg (232 lb 6.4 oz)   SpO2 95%   BMI 38.67 kg/m    Body mass index is 38.67 kg/m .      Physical Exam   GENERAL: healthy, alert and no distress  EYES: Eyes grossly normal to inspection, PERRL and conjunctivae and sclerae normal  HENT: ear canals and TM's normal, nose and mouth without ulcers or lesions  RESP: lungs clear to auscultation - no rales, rhonchi or wheezes  CV: regular rate and rhythm, normal S1 S2, no S3 or S4, no murmur, click or rub, no peripheral edema and peripheral pulses strong  MS: no gross musculoskeletal defects noted, no edema  SKIN: no suspicious lesions or rashes  NEURO: Normal strength and tone, mentation intact and speech normal  PSYCH: mentation appears normal, affect normal/bright  LYMPH: anterior cervical: positive  posterior cervical: positive    Results for orders placed or performed in visit on 12/05/23 (from the past 24 hour(s))   Streptococcus A Rapid Screen w/Reflex to PCR - Clinic Collect    Specimen: Throat; Swab   Result Value Ref Range    Group A Strep antigen Negative Negative   Mononucleosis screen   Result Value Ref Range    Mononucleosis Screen Negative Negative   CBC with platelets and differential    Narrative    The following orders were created for panel order CBC with platelets and differential.  Procedure                               Abnormality         Status                     ---------                               -----------         ------                     CBC with platelets and d...[[id number]]       "                Final result                 Please view results for these tests on the individual orders.   CBC with platelets and differential   Result Value Ref Range    WBC Count 6.5 4.0 - 11.0 10e3/uL    RBC Count 3.94 3.80 - 5.20 10e6/uL    Hemoglobin 12.3 11.7 - 15.7 g/dL    Hematocrit 36.4 35.0 - 47.0 %    MCV 92 78 - 100 fL    MCH 31.2 26.5 - 33.0 pg    MCHC 33.8 31.5 - 36.5 g/dL    RDW 12.7 10.0 - 15.0 %    Platelet Count 269 150 - 450 10e3/uL    % Neutrophils 68 %    % Lymphocytes 25 %    % Monocytes 6 %    % Eosinophils 1 %    % Basophils 0 %    % Immature Granulocytes 0 %    Absolute Neutrophils 4.4 1.6 - 8.3 10e3/uL    Absolute Lymphocytes 1.6 0.8 - 5.3 10e3/uL    Absolute Monocytes 0.4 0.0 - 1.3 10e3/uL    Absolute Eosinophils 0.1 0.0 - 0.7 10e3/uL    Absolute Basophils 0.0 0.0 - 0.2 10e3/uL    Absolute Immature Granulocytes 0.0 <=0.4 10e3/uL

## 2024-01-25 ASSESSMENT — ANXIETY QUESTIONNAIRES
IF YOU CHECKED OFF ANY PROBLEMS ON THIS QUESTIONNAIRE, HOW DIFFICULT HAVE THESE PROBLEMS MADE IT FOR YOU TO DO YOUR WORK, TAKE CARE OF THINGS AT HOME, OR GET ALONG WITH OTHER PEOPLE: NOT DIFFICULT AT ALL
3. WORRYING TOO MUCH ABOUT DIFFERENT THINGS: NOT AT ALL
GAD7 TOTAL SCORE: 0
4. TROUBLE RELAXING: NOT AT ALL
7. FEELING AFRAID AS IF SOMETHING AWFUL MIGHT HAPPEN: NOT AT ALL
7. FEELING AFRAID AS IF SOMETHING AWFUL MIGHT HAPPEN: NOT AT ALL
2. NOT BEING ABLE TO STOP OR CONTROL WORRYING: NOT AT ALL
1. FEELING NERVOUS, ANXIOUS, OR ON EDGE: NOT AT ALL
6. BECOMING EASILY ANNOYED OR IRRITABLE: NOT AT ALL
GAD7 TOTAL SCORE: 0
5. BEING SO RESTLESS THAT IT IS HARD TO SIT STILL: NOT AT ALL
8. IF YOU CHECKED OFF ANY PROBLEMS, HOW DIFFICULT HAVE THESE MADE IT FOR YOU TO DO YOUR WORK, TAKE CARE OF THINGS AT HOME, OR GET ALONG WITH OTHER PEOPLE?: NOT DIFFICULT AT ALL

## 2024-01-26 ENCOUNTER — VIRTUAL VISIT (OUTPATIENT)
Dept: FAMILY MEDICINE | Facility: CLINIC | Age: 34
End: 2024-01-26
Payer: COMMERCIAL

## 2024-01-26 DIAGNOSIS — F33.0 MAJOR DEPRESSIVE DISORDER, RECURRENT EPISODE, MILD WITH ANXIOUS DISTRESS (H): ICD-10-CM

## 2024-01-26 DIAGNOSIS — F41.1 GENERALIZED ANXIETY DISORDER: ICD-10-CM

## 2024-01-26 PROCEDURE — 99214 OFFICE O/P EST MOD 30 MIN: CPT | Mod: 95 | Performed by: NURSE PRACTITIONER

## 2024-01-26 RX ORDER — BUPROPION HYDROCHLORIDE 150 MG/1
150 TABLET ORAL EVERY MORNING
Qty: 90 TABLET | Refills: 3 | Status: SHIPPED | OUTPATIENT
Start: 2024-01-26

## 2024-01-26 RX ORDER — SERTRALINE HYDROCHLORIDE 100 MG/1
150 TABLET, FILM COATED ORAL DAILY
Qty: 135 TABLET | Refills: 3 | Status: SHIPPED | OUTPATIENT
Start: 2024-01-26

## 2024-01-26 NOTE — PROGRESS NOTES
"Carleen is a 33 year old who is being evaluated via a billable video visit.      How would you like to obtain your AVS? MyChart  If the video visit is dropped, the invitation should be resent by:   Will anyone else be joining your video visit? No          Assessment & Plan     Major depressive disorder, recurrent episode, mild with anxious distress (H24)  Stable.  Refilled.  - buPROPion (WELLBUTRIN XL) 150 MG 24 hr tablet; Take 1 tablet (150 mg) by mouth every morning    Generalized anxiety disorder  Improved with dose change.  Refilled.  - buPROPion (WELLBUTRIN XL) 150 MG 24 hr tablet; Take 1 tablet (150 mg) by mouth every morning  - sertraline (ZOLOFT) 100 MG tablet; Take 1.5 tablets (150 mg) by mouth daily            BMI  Estimated body mass index is 38.67 kg/m  as calculated from the following:    Height as of 12/5/23: 1.651 m (5' 5\").    Weight as of 12/5/23: 105.4 kg (232 lb 6.4 oz).           Subjective   Carleen is a 33 year old, presenting for the following health issues:  Recheck Medication      1/26/2024     1:07 PM   Additional Questions   Roomed by colin earl cma   Accompanied by self         1/26/2024     1:07 PM   Patient Reported Additional Medications   Patient reports taking the following new medications na     History of Present Illness       Mental Health Follow-up:  Patient presents to follow-up on Depression & Anxiety.Patient's depression since last visit has been:  Better  The patient is not having other symptoms associated with depression.  Patient's anxiety since last visit has been:  Better  The patient is not having other symptoms associated with anxiety.  Any significant life events: grief or loss  Patient is not feeling anxious or having panic attacks.  Patient has no concerns about alcohol or drug use.    She eats 2-3 servings of fruits and vegetables daily.She consumes 0 sweetened beverage(s) daily.She exercises with enough effort to increase her heart rate 10 to 19 minutes per day.  " She exercises with enough effort to increase her heart rate 3 or less days per week.   She is taking medications regularly.       Increased dose of sertraline as directed, continued wellbutrin.  Noticed improvement in mood.  No side effects.  No change desired.  She has not looked into therapy any further.        Review of Systems  Constitutional, HEENT, cardiovascular, pulmonary, gi and gu systems are negative, except as otherwise noted.      Objective           Vitals:  No vitals were obtained today due to virtual visit.    Physical Exam   GENERAL: alert and no distress  EYES: Eyes grossly normal to inspection.  No discharge or erythema, or obvious scleral/conjunctival abnormalities.  RESP: No audible wheeze, cough, or visible cyanosis.    SKIN: Visible skin clear. No significant rash, abnormal pigmentation or lesions.  NEURO: Cranial nerves grossly intact.  Mentation and speech appropriate for age.  PSYCH: Appropriate affect, tone, and pace of words          Video-Visit Details    Type of service:  Video Visit     Originating Location (pt. Location): Home    Distant Location (provider location):  On-site  Platform used for Video Visit: Dorinda  Signed Electronically by: LISETH Grewal Ra, CNP

## 2024-05-25 ENCOUNTER — HEALTH MAINTENANCE LETTER (OUTPATIENT)
Age: 34
End: 2024-05-25

## 2024-10-07 ENCOUNTER — PATIENT OUTREACH (OUTPATIENT)
Dept: CARE COORDINATION | Facility: CLINIC | Age: 34
End: 2024-10-07
Payer: COMMERCIAL

## 2024-11-11 DIAGNOSIS — F41.1 GENERALIZED ANXIETY DISORDER: ICD-10-CM

## 2024-11-11 RX ORDER — SERTRALINE HYDROCHLORIDE 100 MG/1
150 TABLET, FILM COATED ORAL DAILY
Qty: 135 TABLET | Refills: 0 | Status: SHIPPED | OUTPATIENT
Start: 2024-11-11

## 2024-11-12 NOTE — TELEPHONE ENCOUNTER
SIMONAM to call back for an appointment. Two more attempts will be made.     Mary Jane Jimenez  Lead   [facility] Neena Brower

## 2024-11-13 NOTE — TELEPHONE ENCOUNTER
Spoke with the Pt about her meds being refilled and that she was due for a physical and med check. Pt expressed understanding and was able to schedule a visit late December.     Liliana Brower   [facility]

## 2024-12-13 PROBLEM — F43.21 ADJUSTMENT DISORDER WITH DEPRESSED MOOD: Status: ACTIVE | Noted: 2024-12-13

## 2024-12-23 SDOH — HEALTH STABILITY: PHYSICAL HEALTH: ON AVERAGE, HOW MANY DAYS PER WEEK DO YOU ENGAGE IN MODERATE TO STRENUOUS EXERCISE (LIKE A BRISK WALK)?: 1 DAY

## 2024-12-23 SDOH — HEALTH STABILITY: PHYSICAL HEALTH: ON AVERAGE, HOW MANY MINUTES DO YOU ENGAGE IN EXERCISE AT THIS LEVEL?: 30 MIN

## 2024-12-23 ASSESSMENT — SOCIAL DETERMINANTS OF HEALTH (SDOH): HOW OFTEN DO YOU GET TOGETHER WITH FRIENDS OR RELATIVES?: TWICE A WEEK

## 2024-12-23 ASSESSMENT — PATIENT HEALTH QUESTIONNAIRE - PHQ9
SUM OF ALL RESPONSES TO PHQ QUESTIONS 1-9: 19
SUM OF ALL RESPONSES TO PHQ QUESTIONS 1-9: 19
10. IF YOU CHECKED OFF ANY PROBLEMS, HOW DIFFICULT HAVE THESE PROBLEMS MADE IT FOR YOU TO DO YOUR WORK, TAKE CARE OF THINGS AT HOME, OR GET ALONG WITH OTHER PEOPLE: VERY DIFFICULT

## 2024-12-24 ENCOUNTER — OFFICE VISIT (OUTPATIENT)
Dept: FAMILY MEDICINE | Facility: CLINIC | Age: 34
End: 2024-12-24
Payer: COMMERCIAL

## 2024-12-24 VITALS
WEIGHT: 217 LBS | HEART RATE: 85 BPM | SYSTOLIC BLOOD PRESSURE: 122 MMHG | BODY MASS INDEX: 36.15 KG/M2 | TEMPERATURE: 98.1 F | HEIGHT: 65 IN | OXYGEN SATURATION: 98 % | RESPIRATION RATE: 16 BRPM | DIASTOLIC BLOOD PRESSURE: 78 MMHG

## 2024-12-24 DIAGNOSIS — F41.1 GENERALIZED ANXIETY DISORDER: ICD-10-CM

## 2024-12-24 DIAGNOSIS — Z00.00 ROUTINE GENERAL MEDICAL EXAMINATION AT A HEALTH CARE FACILITY: Primary | ICD-10-CM

## 2024-12-24 DIAGNOSIS — F33.0 MAJOR DEPRESSIVE DISORDER, RECURRENT EPISODE, MILD WITH ANXIOUS DISTRESS (H): ICD-10-CM

## 2024-12-24 DIAGNOSIS — R73.01 ELEVATED FASTING BLOOD SUGAR: ICD-10-CM

## 2024-12-24 LAB
ANION GAP SERPL CALCULATED.3IONS-SCNC: 12 MMOL/L (ref 7–15)
BUN SERPL-MCNC: 8.7 MG/DL (ref 6–20)
CALCIUM SERPL-MCNC: 9.3 MG/DL (ref 8.8–10.4)
CHLORIDE SERPL-SCNC: 103 MMOL/L (ref 98–107)
CHOLEST SERPL-MCNC: 236 MG/DL
CREAT SERPL-MCNC: 0.91 MG/DL (ref 0.51–0.95)
EGFRCR SERPLBLD CKD-EPI 2021: 83 ML/MIN/1.73M2
FASTING STATUS PATIENT QL REPORTED: YES
FASTING STATUS PATIENT QL REPORTED: YES
GLUCOSE SERPL-MCNC: 114 MG/DL (ref 70–99)
HCO3 SERPL-SCNC: 24 MMOL/L (ref 22–29)
HDLC SERPL-MCNC: 37 MG/DL
LDLC SERPL CALC-MCNC: 172 MG/DL
NONHDLC SERPL-MCNC: 199 MG/DL
POTASSIUM SERPL-SCNC: 4.1 MMOL/L (ref 3.4–5.3)
SODIUM SERPL-SCNC: 139 MMOL/L (ref 135–145)
TRIGL SERPL-MCNC: 134 MG/DL

## 2024-12-24 PROCEDURE — 80048 BASIC METABOLIC PNL TOTAL CA: CPT | Performed by: NURSE PRACTITIONER

## 2024-12-24 PROCEDURE — 80061 LIPID PANEL: CPT | Performed by: NURSE PRACTITIONER

## 2024-12-24 PROCEDURE — 96127 BRIEF EMOTIONAL/BEHAV ASSMT: CPT | Performed by: NURSE PRACTITIONER

## 2024-12-24 PROCEDURE — 36415 COLL VENOUS BLD VENIPUNCTURE: CPT | Performed by: NURSE PRACTITIONER

## 2024-12-24 PROCEDURE — 99395 PREV VISIT EST AGE 18-39: CPT | Performed by: NURSE PRACTITIONER

## 2024-12-24 PROCEDURE — 99214 OFFICE O/P EST MOD 30 MIN: CPT | Mod: 25 | Performed by: NURSE PRACTITIONER

## 2024-12-24 RX ORDER — BUPROPION HYDROCHLORIDE 150 MG/1
150 TABLET ORAL EVERY MORNING
Qty: 90 TABLET | Refills: 3 | Status: SHIPPED | OUTPATIENT
Start: 2024-12-24

## 2024-12-24 RX ORDER — SERTRALINE HYDROCHLORIDE 100 MG/1
150 TABLET, FILM COATED ORAL DAILY
Qty: 135 TABLET | Refills: 3 | Status: SHIPPED | OUTPATIENT
Start: 2024-12-24

## 2024-12-24 ASSESSMENT — PAIN SCALES - GENERAL: PAINLEVEL_OUTOF10: NO PAIN (0)
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
## 2024-12-24 NOTE — PATIENT INSTRUCTIONS
Patient Education   Preventive Care Advice   This is general advice given by our system to help you stay healthy. However, your care team may have specific advice just for you. Please talk to your care team about your preventive care needs.  Nutrition  Eat 5 or more servings of fruits and vegetables each day.  Try wheat bread, brown rice and whole grain pasta (instead of white bread, rice, and pasta).  Get enough calcium and vitamin D. Check the label on foods and aim for 100% of the RDA (recommended daily allowance).  Lifestyle  Exercise at least 150 minutes each week  (30 minutes a day, 5 days a week).  Do muscle strengthening activities 2 days a week. These help control your weight and prevent disease.  No smoking.  Wear sunscreen to prevent skin cancer.  Have a dental exam and cleaning every 6 months.  Yearly exams  See your health care team every year to talk about:  Any changes in your health.  Any medicines your care team has prescribed.  Preventive care, family planning, and ways to prevent chronic diseases.  Shots (vaccines)   HPV shots (up to age 26), if you've never had them before.  Hepatitis B shots (up to age 59), if you've never had them before.  COVID-19 shot: Get this shot when it's due.  Flu shot: Get a flu shot every year.  Tetanus shot: Get a tetanus shot every 10 years.  Pneumococcal, hepatitis A, and RSV shots: Ask your care team if you need these based on your risk.  Shingles shot (for age 50 and up)  General health tests  Diabetes screening:  Starting at age 35, Get screened for diabetes at least every 3 years.  If you are younger than age 35, ask your care team if you should be screened for diabetes.  Cholesterol test: At age 39, start having a cholesterol test every 5 years, or more often if advised.  Bone density scan (DEXA): At age 50, ask your care team if you should have this scan for osteoporosis (brittle bones).  Hepatitis C: Get tested at least once in your life.  STIs (sexually  transmitted infections)  Before age 24: Ask your care team if you should be screened for STIs.  After age 24: Get screened for STIs if you're at risk. You are at risk for STIs (including HIV) if:  You are sexually active with more than one person.  You don't use condoms every time.  You or a partner was diagnosed with a sexually transmitted infection.  If you are at risk for HIV, ask about PrEP medicine to prevent HIV.  Get tested for HIV at least once in your life, whether you are at risk for HIV or not.  Cancer screening tests  Cervical cancer screening: If you have a cervix, begin getting regular cervical cancer screening tests starting at age 21.  Breast cancer scan (mammogram): If you've ever had breasts, begin having regular mammograms starting at age 40. This is a scan to check for breast cancer.  Colon cancer screening: It is important to start screening for colon cancer at age 45.  Have a colonoscopy test every 10 years (or more often if you're at risk) Or, ask your provider about stool tests like a FIT test every year or Cologuard test every 3 years.  To learn more about your testing options, visit:   .  For help making a decision, visit:   https://bit.ly/bk98653.  Prostate cancer screening test: If you have a prostate, ask your care team if a prostate cancer screening test (PSA) at age 55 is right for you.  Lung cancer screening: If you are a current or former smoker ages 50 to 80, ask your care team if ongoing lung cancer screenings are right for you.  For informational purposes only. Not to replace the advice of your health care provider. Copyright   2023 [facility]. All rights reserved. Clinically reviewed by the [facility] Transitions Program. [employer] [id number] - REV 01/24.  Learning About Stress  What is stress?     Stress is your body's response to a hard situation. Your body can have a physical, emotional, or mental response. Stress is a fact of life for most people, and it  affects everyone differently. What causes stress for you may not be stressful for someone else.  A lot of things can cause stress. You may feel stress when you go on a job interview, take a test, or run a race. This kind of short-term stress is normal and even useful. It can help you if you need to work hard or react quickly. For example, stress can help you finish an important job on time.  Long-term stress is caused by ongoing stressful situations or events. Examples of long-term stress include long-term health problems, ongoing problems at work, or conflicts in your family. Long-term stress can harm your health.  How does stress affect your health?  When you are stressed, your body responds as though you are in danger. It makes hormones that speed up your heart, make you breathe faster, and give you a burst of energy. This is called the fight-or-flight stress response. If the stress is over quickly, your body goes back to normal and no harm is done.  But if stress happens too often or lasts too long, it can have bad effects. Long-term stress can make you more likely to get sick, and it can make symptoms of some diseases worse. If you tense up when you are stressed, you may develop neck, shoulder, or low back pain. Stress is linked to high blood pressure and heart disease.  Stress also harms your emotional health. It can make you negrete, tense, or depressed. Your relationships may suffer, and you may not do well at work or school.  What can you do to manage stress?  You can try these things to help manage stress:   Do something active. Exercise or activity can help reduce stress. Walking is a great way to get started. Even everyday activities such as housecleaning or yard work can help.  Try yoga or trista chi. These techniques combine exercise and meditation. You may need some training at first to learn them.  Do something you enjoy. For example, listen to music or go to a movie. Practice your hobby or do volunteer  "work.  Meditate. This can help you relax, because you are not worrying about what happened before or what may happen in the future.  Do guided imagery. Imagine yourself in any setting that helps you feel calm. You can use online videos, books, or a teacher to guide you.  Do breathing exercises. For example:  From a standing position, bend forward from the waist with your knees slightly bent. Let your arms dangle close to the floor.  Breathe in slowly and deeply as you return to a standing position. Roll up slowly and lift your head last.  Hold your breath for just a few seconds in the standing position.  Breathe out slowly and bend forward from the waist.  Let your feelings out. Talk, laugh, cry, and express anger when you need to. Talking with supportive friends or family, a counselor, or a michael leader about your feelings is a healthy way to relieve stress. Avoid discussing your feelings with people who make you feel worse.  Write. It may help to write about things that are bothering you. This helps you find out how much stress you feel and what is causing it. When you know this, you can find better ways to cope.  What can you do to prevent stress?  You might try some of these things to help prevent stress:  Manage your time. This helps you find time to do the things you want and need to do.  Get enough sleep. Your body recovers from the stresses of the day while you are sleeping.  Get support. Your family, friends, and community can make a difference in how you experience stress.  Limit your news feed. Avoid or limit time on social media or news that may make you feel stressed.  Do something active. Exercise or activity can help reduce stress. Walking is a great way to get started.  Where can you learn more?  Go to https://www.[employer].net/patiented  Enter N032 in the search box to learn more about \"Learning About Stress.\"  Current as of: October 24, 2023  Content Version: 14.3    2024 [employer]. "   Care instructions adapted under license by your healthcare professional. If you have questions about a medical condition or this instruction, always ask your healthcare professional. [employer] disclaims any warranty or liability for your use of this information.    Learning About Depression Screening  What is depression screening?  Depression screening is a way to see if you have depression symptoms. It may be done by a doctor or counselor. It's often part of a routine checkup. That's because your mental health is just as important as your physical health.  Depression is a mental health condition that affects how you feel, think, and act. You may:  Have less energy.  Lose interest in your daily activities.  Feel sad and grouchy for a long time.  Depression is very common. It affects people of all ages.  Many things can lead to depression. Some people become depressed after they have a stroke or find out they have a major illness like cancer or heart disease. The death of a loved one or a breakup may lead to depression. It can run in families. Most experts believe that a combination of inherited genes and stressful life events can cause it.  What happens during screening?  You may be asked to fill out a form about your depression symptoms. You and the doctor will discuss your answers. The doctor may ask you more questions to learn more about how you think, act, and feel.  What happens after screening?  If you have symptoms of depression, your doctor will talk to you about your options.  Doctors usually treat depression with medicines or counseling. Often, combining the two works best. Many people don't get help because they think that they'll get over the depression on their own. But people with depression may not get better unless they get treatment.  The cause of depression is not well understood. There may be many factors involved. But if you have depression, it's not your fault.  A serious symptom  "of depression is thinking about death or suicide. If you or someone you care about talks about this or about feeling hopeless, get help right away.  It's important to know that depression can be treated. Medicine, counseling, and self-care may help.  Where can you learn more?  Go to https://www.[employer].net/patiented  Enter T185 in the search box to learn more about \"Learning About Depression Screening.\"  Current as of: July 31, 2024  Content Version: 14.3    2024 [employer].   Care instructions adapted under license by your healthcare professional. If you have questions about a medical condition or this instruction, always ask your healthcare professional. [employer] disclaims any warranty or liability for your use of this information.       "

## 2024-12-26 ENCOUNTER — VIRTUAL VISIT (OUTPATIENT)
Dept: BEHAVIORAL HEALTH | Facility: CLINIC | Age: 34
End: 2024-12-26
Payer: COMMERCIAL

## 2024-12-26 DIAGNOSIS — F43.21 ADJUSTMENT DISORDER WITH DEPRESSED MOOD: Primary | ICD-10-CM

## 2024-12-26 ASSESSMENT — COLUMBIA-SUICIDE SEVERITY RATING SCALE - C-SSRS
ATTEMPT LIFETIME: NO
1. HAVE YOU WISHED YOU WERE DEAD OR WISHED YOU COULD GO TO SLEEP AND NOT WAKE UP?: NO
2. HAVE YOU ACTUALLY HAD ANY THOUGHTS OF KILLING YOURSELF?: NO

## 2024-12-26 NOTE — PROGRESS NOTES
[facility]: Integrated Behavioral Health     Integrated Behavioral Health Services   Mental Health and Addiction Services     Brief Diagnostic Assessment        PATIENT'S NAME: Carleen Knox  MRN: [id number]     : 1990     DATE OF SERVICE: 2024  SERVICE LOCATION: [facility] / Email (patient reached)   SERVICE MODALITY: Video Visit:      Provider verified identity through the following two step process.  Patient provided:  Patient is known previously to provider    Telemedicine Visit: The patient's condition can be safely assessed and treated via synchronous audio and visual telemedicine encounter.      Reason for Telemedicine Visit: Patient has requested telehealth visit    Originating Site (Patient Location): Patient's home    Distant Site (Provider Location): [facility]    Consent:  The patient/guardian has verbally consented to: the potential risks and benefits of telemedicine (video visit) versus in person care; bill my insurance or make self-payment for services provided; and responsibility for payment of non-covered services.     Patient would like the video invitation sent by:  My Chart    Mode of Communication:  Video Conference via [facility]    Distant Location (Provider):  On-site    As the provider I attest to compliance with applicable laws and regulations related to telemedicine.  Visit Start Time: 12:29 PM  Visit End Time:  1256PM    VISIT NUMBER: 2  [facility] Visit Activities: [facility] Only     Assessments completed prior to visit:     The following assessments were completed by patient for this visit:  PHQ9:       3/8/2021     3:38 PM 2021     1:04 PM 4/10/2023     8:35 AM 10/24/2023    11:42 AM 2023     9:52 AM 2024    10:21 PM 2024    10:22 AM   PHQ-9 SCORE   PHQ-9 Total Score [facility]   5 (Mild depression) 7 (Mild depression) 0 10 (Moderate depression) 19 (Moderately severe  depression)   PHQ-9 Total Score 4 2 5 7 0 10  19        Patient-reported     GAD7:       11/11/2020    12:20 PM 3/8/2021     3:38 PM 8/31/2021     1:04 PM 12/19/2022    10:01 PM 10/18/2023    10:38 AM 1/25/2024    10:13 PM 12/12/2024    10:37 PM   CLARIBEL-7 SCORE   Total Score 3 (minimal anxiety)   8 (mild anxiety) 9 (mild anxiety) 0 (minimal anxiety) 17 (severe anxiety)   Total Score 3 2 1 8 9 0 17        Patient-reported     CAGE-AID:       12/12/2024    10:39 PM   CAGE-AID Total Score   Total Score 0    Total Score MyChart 0 (A total score of 2 or greater is considered clinically significant)       Patient-reported     PROMIS 10-Global Health (all questions and answers displayed):       12/12/2024    10:39 PM   PROMIS 10   In general, would you say your health is: Fair   In general, would you say your quality of life is: Fair   In general, how would you rate your physical health? Poor   In general, how would you rate your mental health, including your mood and your ability to think? Poor   In general, how would you rate your satisfaction with your social activities and relationships? Fair   In general, please rate how well you carry out your usual social activities and roles Fair   To what extent are you able to carry out your everyday physical activities such as walking, climbing stairs, carrying groceries, or moving a chair? Moderately   In the past 7 days, how often have you been bothered by emotional problems such as feeling anxious, depressed, or irritable? Always   In the past 7 days, how would you rate your fatigue on average? Moderate   In the past 7 days, how would you rate your pain on average, where 0 means no pain, and 10 means worst imaginable pain? 8   In general, would you say your health is: 2   In general, would you say your quality of life is: 2   In general, how would you rate your physical health? 1   In general, how would you rate your mental health, including your mood and your ability to think?  1   In general, how would you rate your satisfaction with your social activities and relationships? 2   In general, please rate how well you carry out your usual social activities and roles. (This includes activities at home, at work and in your community, and responsibilities as a parent, child, spouse, employee, friend, etc.) 2   To what extent are you able to carry out your everyday physical activities such as walking, climbing stairs, carrying groceries, or moving a chair? 3   In the past 7 days, how often have you been bothered by emotional problems such as feeling anxious, depressed, or irritable? 5   In the past 7 days, how would you rate your fatigue on average? 3   In the past 7 days, how would you rate your pain on average, where 0 means no pain, and 10 means worst imaginable pain? 8   Global Mental Health Score 6    Global Physical Health Score 9    PROMIS TOTAL - SUBSCORES 15        Patient-reported     [place] Suicide Severity Rating Scale (Lifetime/Recent)      8/4/2012     5:59 PM   [place] Suicide Severity Rating (Lifetime/Recent)   Do you have guns available to you? No        Identifying Information:     Patient is a 34 year old female,      ,      adult.  Patient attended the session alone.         Referral:   Who referred you to care:  ,   Kelsea CHILDERS , [facility].    Reason for referral: clarify behavioral health diagnosis and determine behavioral health treatment options.        Patient's Statement of Presenting Concern:     Patient reports the following reason(s) for seeking an assessment at this time:   Increased anxiety symptoms, difficulty sleeping.  Patient stated that symptoms have resulted in the following functional impairments: health maintenance, management of the household and or completion of tasks, relationship(s), self-care, and work / vocational responsibilities     History of Presenting Concern:     Patient reports that these problem(s)  began    in correlation with life changes related to divorce. Patient has not attempted to resolve these concerns in the past. Patient reports that other professional(s) are involved in providing support / services. PCP     Social/Family History:     The patient describes their cultural background as  ,      Cultural influences and impact on patient's life structure, values, norms, and healthcare:    No .      Contextual influences on patient's health include: Contextual Factors: Individual Factors precipitating event is divorce and correlated stressors and changes  .      Cultural, Contextual, and socioeconomic factors do not affect the patient's access to services.  These factors will be addressed in the Preliminary Treatment plan.    Patient identified their preferred language to be  ,  English. Patient reported they do not  need the assistance of an  or other support involved in therapy.      Current living situation:  , , , , , , , , ,   living in house Pt and former  bought, selling this summer       Socioeconomic status and needs: does not have financial concerns.     Patient's current significant relationships include:  ,   Parents and sisters    Patient's sexual orientation is  heterosexual,     Patient reported having   1 children.      Patient identified few stable and meaningful social connections.      Patient identified the following strengths or resources that will help her     Highest education level was  ,  some college.      Patient is currently   employed full time and reports @HIS@ is not able to function appropriately at work..     Patient reported that they   have not been involved with the legal system. Do you have a probation office? Patient   denies being on probation / parole / under the jurisdiction of the court       Medical History:    Family History of Mental Health: Yes: Depression and anxiety siblings     Current Medical Health Concerns: None  reported    Current Medication:    Patient reports current meds as:   Current Outpatient Medications   Medication Sig Dispense Refill    buPROPion (WELLBUTRIN XL) 150 MG 24 hr tablet Take 1 tablet (150 mg) by mouth every morning. 90 tablet 3    levonorgestrel (MIRENA) 20 MCG/24HR IUD 1 each (20 mcg) by Intrauterine route once for 1 dose 1 each 0    sertraline (ZOLOFT) 100 MG tablet Take 1.5 tablets (150 mg) by mouth daily. 135 tablet 3     No current facility-administered medications for this visit.        Medication Adherence:     Patient reports taking/not taking prescription medications as prescribed:   taking psychiatric medications as prescribed.     Substance Use:      Patient reports using alcohol 3 times per month and has 3 glasses of wine at a time. Patient first started drinking at age 19.  Patient reported date of last use was last week.  Patient reports heaviest use was none.  Patient denies any symptoms of withdrawal..  Patient has had periods of abstinence and reports the following circumstances contributed to return to use:  NA..   Patient denies using tobacco  Patient denies using cannabis.   Patient reports using caffeine 2 times per day and drinks 1 at a time. Patient started using caffeine at age [age].   Patient reports using/abusing the following substance(s). Patient denies any history of substance use.      Substance Use: No symptoms     Based on the negative CAGE score and clinical interview there  are not indications of drug or alcohol abuse.        Significant Losses / Trauma / Abuse / Neglect Issues:     There are indications or report of significant loss, trauma, abuse or neglect issues related to: divorce / relational changes within the last year .   Issues of possible neglect are not present.           Mental Status Assessment:     Appearance:   Appropriate    Eye Contact:   Good    Psychomotor Behavior: Normal    Attitude:   Cooperative    Orientation:   All   Speech Rate /  Production: Emotional Normal    Volume:   Normal    Mood:    Sad    Affect:    Flat    Thought Content:  Clear    Thought Form:  Coherent  Logical    Insight:    Good          Safety Assessment:     Patient denies a history of suicidal ideation, suicide attempts, self-injurious behavior, homicidal ideation, homicidal behavior, and and other safety concerns   Patient denies current or recent suicidal ideation or behaviors.   Patient denies current or recent homicidal ideation or behaviors.   Patient denies current or recent self injurious behavior or ideation.   Patient denies other safety concerns.   Patient reports there are/are not firearms in the house   ;  no firearms in the house   Protective Factors  ;   Children in the home , Positive coping skills, and Positive problem-solving skills    Risk Factors Current high stress    Plan for Safety and Risk Management:     Safety and Risk: Recommended that patient call 911 or go to the local ED should there be a change in any of these risk factors.          Report to child / adult protection services was NA.        Diagnostic Criteria:     Major Depressive Disorder  CRITERIA (A-C) REPRESENT A MAJOR DEPRESSIVE EPISODE - SELECT THESE CRITERIA  A) Single episode - symptoms have been present during the same 2-week period and represent a change from previous functioning 5 or more symptoms (required for diagnosis)   - Depressed mood. Note: In children and adolescents, can be irritable mood.     - Diminished interest or pleasure in all, or almost all, activities.    - Psychomotor activity retardation.    - Fatigue or loss of energy.    - Diminished ability to think or concentrate, or indecisiveness.   B) The symptoms cause clinically significant distress or impairment in social, occupational, or other important areas of functioning  C) The episode is not attributable to the physiological effects of a substance or to another medical condition  D) The occurence of major  depressive episode is not better explained by other thought / psychotic disorders  E) There has never been a manic episode or hypomanic episode     DSM5 Diagnoses:      Diagnoses: Adjustment Disorders  309.0 (F43.21) With depressed mood   Psychosocial / Contextual Factors: Grief/Loss       Preliminary Treatment Plan:     It is expected that patient will need less than 10 psychotherapy sessions in the next 12 months, as they have received less than 10 in the previous year.     Chemical dependency recommendations: No indications of CD issues     As a preliminary treatment goal, patient  develop effective sleep routine and patterns and will develop coping skills to effectively manage attention issues.     Collaboration with primary care team .     The following referral(s) will be initiated: Made referral for [facility] individual therapy .     A Release of Information is not needed at this time.             Stevie Land LMFT, [facility]   December 26, 2024

## 2024-12-26 NOTE — PATIENT INSTRUCTIONS
Groups in [place], [place]  [facility] divorce recovery support groups meeting weekly in [place].  Click on a group date to find out more information about the group.  [facility] also have the DC4K ministry program, but may or may not be  offering the DC4K program during this 13-week cycle. Please contact the [facility] for  more details.  Current groups  Sponsor Location Dates  [facility]  Abdi Oates,   [phone] [address] [place] [address] Tuesdays at 6:00pm [place]  Oct 8, 2024 - Feb 4, 2025  Other possible locations  These sponsors have not entered group meeting times into our database, but may  currently be offering sessions. Please contact them directly for details.  Sponsor Location Phone  Taty Monroy  [address] [place], [phone]

## 2025-01-06 ENCOUNTER — VIRTUAL VISIT (OUTPATIENT)
Dept: BEHAVIORAL HEALTH | Facility: CLINIC | Age: 35
End: 2025-01-06
Payer: COMMERCIAL

## 2025-01-06 DIAGNOSIS — F43.21 ADJUSTMENT DISORDER WITH DEPRESSED MOOD: Primary | ICD-10-CM

## 2025-01-06 NOTE — PROGRESS NOTES
[facility] Primary Care: Integrated Behavioral Health    Behavioral Health Clinician Progress Note   Mental Health & Addiction Services      Monday January 06, 2025    Patient Name: Carleen Knox       Service Type:  Individual   Service Location:  MyChart / Email (patient reached)   Visit Start Time: 11:31 AM  Visit End Time:  11:57 AM   Session Length: 16 - 37    Attendees: Patient   Service Modality: Video Visit:      Provider verified identity through the following two step process.  Patient provided:  Patient is known previously to provider    Telemedicine Visit: The patient's condition can be safely assessed and treated via synchronous audio and visual telemedicine encounter.      Reason for Telemedicine Visit: Patient has requested telehealth visit    Originating Site (Patient Location): Patient's home    Distant Site (Provider Location): [facility]    Consent:  The patient/guardian has verbally consented to: the potential risks and benefits of telemedicine (video visit) versus in person care; bill my insurance or make self-payment for services provided; and responsibility for payment of non-covered services.     Patient would like the video invitation sent by:  My Chart    Mode of Communication:  Video Conference via [facility]    Distant Location (Provider):  On-site    As the provider I attest to compliance with applicable laws and regulations related to telemedicine.   Visit number: 2    [facility] Visit Activities (Refresh list every visit): [facility] Only     Date of Brief Diagnostic Assessment : 12/26/24  Treatment Plan Review Date: 6/26/25      DATA:    Extended Session (60+ minutes): No   Interactive Complexity: No   Crisis: No   [facility] Patient: No     Assessments completed prior to visit:   PHQ9:       3/8/2021     3:38 PM 8/31/2021     1:04 PM 4/10/2023     8:35 AM 10/24/2023    11:42 AM 12/4/2023     9:52 AM 12/12/2024    10:21 PM  12/23/2024    10:22 AM   PHQ-9 SCORE   PHQ-9 Total Score MyChart   5 (Mild depression) 7 (Mild depression) 0 10 (Moderate depression) 19 (Moderately severe depression)   PHQ-9 Total Score 4 2 5 7 0 10  19        Patient-reported     GAD7:       11/11/2020    12:20 PM 3/8/2021     3:38 PM 8/31/2021     1:04 PM 12/19/2022    10:01 PM 10/18/2023    10:38 AM 1/25/2024    10:13 PM 12/12/2024    10:37 PM   CLARIBEL-7 SCORE   Total Score 3 (minimal anxiety)   8 (mild anxiety) 9 (mild anxiety) 0 (minimal anxiety) 17 (severe anxiety)   Total Score 3 2 1 8 9 0 17        Patient-reported     CAGE-AID:       12/12/2024    10:39 PM   CAGE-AID Total Score   Total Score 0    Total Score MyChart 0 (A total score of 2 or greater is considered clinically significant)       Patient-reported     PROMIS 10-Global Health (all questions and answers displayed):       12/12/2024    10:39 PM   PROMIS 10   In general, would you say your health is: Fair   In general, would you say your quality of life is: Fair   In general, how would you rate your physical health? Poor   In general, how would you rate your mental health, including your mood and your ability to think? Poor   In general, how would you rate your satisfaction with your social activities and relationships? Fair   In general, please rate how well you carry out your usual social activities and roles Fair   To what extent are you able to carry out your everyday physical activities such as walking, climbing stairs, carrying groceries, or moving a chair? Moderately   In the past 7 days, how often have you been bothered by emotional problems such as feeling anxious, depressed, or irritable? Always   In the past 7 days, how would you rate your fatigue on average? Moderate   In the past 7 days, how would you rate your pain on average, where 0 means no pain, and 10 means worst imaginable pain? 8   In general, would you say your health is: 2   In general, would you say your quality of life is: 2  "  In general, how would you rate your physical health? 1   In general, how would you rate your mental health, including your mood and your ability to think? 1   In general, how would you rate your satisfaction with your social activities and relationships? 2   In general, please rate how well you carry out your usual social activities and roles. (This includes activities at home, at work and in your community, and responsibilities as a parent, child, spouse, employee, friend, etc.) 2   To what extent are you able to carry out your everyday physical activities such as walking, climbing stairs, carrying groceries, or moving a chair? 3   In the past 7 days, how often have you been bothered by emotional problems such as feeling anxious, depressed, or irritable? 5   In the past 7 days, how would you rate your fatigue on average? 3   In the past 7 days, how would you rate your pain on average, where 0 means no pain, and 10 means worst imaginable pain? 8   Global Mental Health Score 6    Global Physical Health Score 9    PROMIS TOTAL - SUBSCORES 15        Patient-reported     [place] Suicide Severity Rating Scale (Lifetime/Recent)      8/4/2012     5:59 PM 12/26/2024    12:57 PM   [place] Suicide Severity Rating (Lifetime/Recent)   Do you have guns available to you? No    Q1 Wish to be Dead (Lifetime)  N   Q2 Non-Specific Active Suicidal Thoughts (Lifetime)  N   Actual Attempt (Lifetime)  N   Calculated C-SSRS Risk Score (Lifetime/Recent)  No Risk Indicated     \"     Reason for Visit/Presenting Concern:  Anxiety and Grief/Loss    Current Stressors / Issues:   Stress relief last Saturday met with spouse to discuss finances and came to an agreement. Weight lifted off - reducing anxiety from 8-9 to 4-5. Last night first night feeling tired at a normal time. Felt relief to be able to feel sleepy again. Found having a 3rd party, being in person and having time boundary in place was helpful. Realized the biggest trigger was " "\"thinking we weren't going to be able to come to an agreement.\" The fear was \"if we can't come to an agreement, we have to spend money on a  or start a court process. Worrying about bills.\"     Pt identified pattern of catastrophizing cognitive distortions: will practice challenging    Therapeutic Interventions:  Cognitive Behavioral Therapy (CBT): Provided psycho-education on cognitive distortions. and Reinforced successes reported by patient since last appointment.    Response to treatment interventions:   Patient was receptive to interventions utilized.  Patient was engaged in the therapy process.       Progress on Treatment Objective(s) / Homework:   New Objective established this session - ACTION (Actively working towards change); Intervened by reinforcing change plan / affirming steps taken     Medication Review:   No changes to current psychiatric medication(s)     Medication Compliance:   Yes     Chemical Use Review:  Substance Use: Chemical use reviewed, no active concerns identified      Tobacco Use: No current tobacco use.       Assessment: Current Emotional / Mental Status (status of significant symptoms):    Risk status (Self / Other harm or suicidal ideation)   Patient denies a history of suicidal ideation, suicide attempts, self-injurious behavior, homicidal ideation, homicidal behavior, and and other safety concerns   Patient denies current fears or concerns for personal safety.   Patient denies current or recent suicidal ideation or behaviors.   Patient denies current or recent homicidal ideation or behaviors.   Patient denies current or recent self injurious behavior or ideation.   Patient denies other safety concerns.   Recommended that patient call 911 or go to the local ED should there be a change in any of these risk factors      ASSESSMENT:   Mental Status:     Appearance:   Appropriate    Eye Contact:   Good    Psychomotor Behavior: Normal    Attitude:   Cooperative  Friendly Pleasant "   Orientation:   All   Speech Rate / Production: Normal    Volume:   Normal    Mood:    Normal   Affect:    Appropriate    Thought Content:  Clear    Thought Form:  Coherent  Logical    Insight:    Good          Diagnoses:   Data Unavailable    Collateral Reports Completed:   Not Applicable       Plan: (Homework, other):   Patient was provided No indications of CD issues  Patient was given information about behavioral services and encouraged to schedule a follow up appointment with the clinic [facility] in 2 weeks.         Stevie Land [facility], [facility]     _____________________________________________________________________________________________________________________________________                                              Individual Treatment Plan    Patient's Name: Carleen Knox   YOB: 1990  Date of Creation: 1/6/25  Date Treatment Plan Last Reviewed/Revised: 1/6/25    DSM5 Diagnoses: Adjustment Disorders  309.28 (F43.23) With mixed anxiety and depressed mood  Psychosocial / Contextual Factors: Relationship Concerns and Grief/Loss  PROMIS (reviewed every 90 days):   The following assessments were completed by patient for this visit:  PHQ9:       3/8/2021     3:38 PM 8/31/2021     1:04 PM 4/10/2023     8:35 AM 10/24/2023    11:42 AM 12/4/2023     9:52 AM 12/12/2024    10:21 PM 12/23/2024    10:22 AM   PHQ-9 SCORE   PHQ-9 Total Score MyChart   5 (Mild depression) 7 (Mild depression) 0 10 (Moderate depression) 19 (Moderately severe depression)   PHQ-9 Total Score 4 2 5 7 0 10  19        Patient-reported     GAD7:       11/11/2020    12:20 PM 3/8/2021     3:38 PM 8/31/2021     1:04 PM 12/19/2022    10:01 PM 10/18/2023    10:38 AM 1/25/2024    10:13 PM 12/12/2024    10:37 PM   CLARIBEL-7 SCORE   Total Score 3 (minimal anxiety)   8 (mild anxiety) 9 (mild anxiety) 0 (minimal anxiety) 17 (severe anxiety)   Total Score 3 2 1 8 9 0 17        Patient-reported     CAGE-AID:       12/12/2024    10:39 PM   CAGE-AID  Total Score   Total Score 0    Total Score MyChart 0 (A total score of 2 or greater is considered clinically significant)       Patient-reported     PROMIS 10-Global Health (all questions and answers displayed):       12/12/2024    10:39 PM   PROMIS 10   In general, would you say your health is: Fair   In general, would you say your quality of life is: Fair   In general, how would you rate your physical health? Poor   In general, how would you rate your mental health, including your mood and your ability to think? Poor   In general, how would you rate your satisfaction with your social activities and relationships? Fair   In general, please rate how well you carry out your usual social activities and roles Fair   To what extent are you able to carry out your everyday physical activities such as walking, climbing stairs, carrying groceries, or moving a chair? Moderately   In the past 7 days, how often have you been bothered by emotional problems such as feeling anxious, depressed, or irritable? Always   In the past 7 days, how would you rate your fatigue on average? Moderate   In the past 7 days, how would you rate your pain on average, where 0 means no pain, and 10 means worst imaginable pain? 8   In general, would you say your health is: 2   In general, would you say your quality of life is: 2   In general, how would you rate your physical health? 1   In general, how would you rate your mental health, including your mood and your ability to think? 1   In general, how would you rate your satisfaction with your social activities and relationships? 2   In general, please rate how well you carry out your usual social activities and roles. (This includes activities at home, at work and in your community, and responsibilities as a parent, child, spouse, employee, friend, etc.) 2   To what extent are you able to carry out your everyday physical activities such as walking, climbing stairs, carrying groceries, or moving a  chair? 3   In the past 7 days, how often have you been bothered by emotional problems such as feeling anxious, depressed, or irritable? 5   In the past 7 days, how would you rate your fatigue on average? 3   In the past 7 days, how would you rate your pain on average, where 0 means no pain, and 10 means worst imaginable pain? 8   Global Mental Health Score 6    Global Physical Health Score 9    PROMIS TOTAL - SUBSCORES 15        Patient-reported     [place] Suicide Severity Rating Scale (Lifetime/Recent)      8/4/2012     5:59 PM 12/26/2024    12:57 PM   [place] Suicide Severity Rating (Lifetime/Recent)   Do you have guns available to you? No    Q1 Wish to be Dead (Lifetime)  N   Q2 Non-Specific Active Suicidal Thoughts (Lifetime)  N   Actual Attempt (Lifetime)  N   Calculated C-SSRS Risk Score (Lifetime/Recent)  No Risk Indicated        Referral / Collaboration:  [place] [facility] [place] [facility] .    Anticipated number of session for this episode of care:  3-6 sessions  Anticipation frequency of session: Biweekly  Anticipated Duration of each session: 16-37 minutes  Treatment plan will be reviewed in 90 days or when goals have been changed.       MeasurableTreatment Goal(s) related to diagnosis / functional impairment(s)  Goal 1: Patient will improve management of anxiety symptoms as evidenced by reduced frequency, intensity and duration of anxiety symptoms. learn and implement coping skills that result in a reduction of anxiety and nervousness. improve daily functioning. recognize contributing factors of anxiety and identify ways to intervene. enhance ability to effectively cope with life anxieties as they occur.    I will know I've met my goal when anxiety stays below an 8 out of 10.      Status: New - Date: 1/06/25      Intervention(s)  [facility] will Cognitive Behavioral Therapy (CBT): Provide psycho-education on cognitive distortions., Identify and challenge maladaptive patterns of behavior and thinking that  interfere with patient's life, Identify behavioral changes that can be made to move towards treatment goals. , Assist patient in developing coping strategies (e.g. more physical exercise, less internal focus, increase social involvement, more assertiveness, etc.)., and Reinforce successes reported by patient since last appointment.  Psycho-education: Provide psycho-education about patient's behavioral health condition and symptoms. Explain and reviewed treatment options.         Patient has reviewed and agreed to the above plan.     Written by  Stevie SULLIVAN, [facility]

## 2025-01-06 NOTE — PATIENT INSTRUCTIONS
"        Cognitive distortions are irrational or biased ways of thinking that can negatively influence emotions and behaviors. These thought patterns often arise automatically and can contribute to issues like anxiety, depression, and stress by distorting reality or amplifying negative interpretations of events. By building your awareness of the thoughts, you can more skillfully and effectively cultivate more helpful thought patterns and in turn more helpful responses to automatic thoughts    10 Common Cognitive Distortions    1. All-or-Nothing Thinking  Explanation: Viewing situations in black-and-white terms, with no middle ground. If something isn t perfect, it s perceived as a total failure.  Example: \"If I don t get an A on this test, I m a complete failure.\"    2. Overgeneralization  Explanation: Drawing broad conclusions based on a single event or isolated incident.  Example: \"I didn t get the job I applied for, so I ll never find a good job.\"    3. Mental Filtering  Explanation: Focusing exclusively on the negative aspects of a situation while ignoring the positives.  Example: \"My boss complimented my work, but she also mentioned one small mistake. Clearly, I m not doing well.\"    4. Discounting the Positive  Explanation: Dismissing positive experiences or accomplishments as insignificant or unearned.  Example: \"I only got that promotion because they couldn t find anyone else.\"    5. Catastrophizing  Explanation: Expecting the worst possible outcome or exaggerating the significance of a problem.  Example: \"If I make a mistake during this presentation, everyone will think I m incompetent, and I ll lose my job.\"    6. Mind Reading  Explanation: Assuming you know what others are thinking, often attributing negative intentions or judgments without evidence.  Example: \"She didn t say hello to me because she doesn t like me.\"    7. Fortune-Telling  Explanation: Predicting future outcomes as negative or inevitable " "without sufficient evidence.  Example: \"I just know I m going to fail this exam, no matter how much I study.\"    8. Emotional Reasoning  Explanation: Believing that emotions reflect facts, regardless of objective evidence.  Example: \"I feel so anxious about this meeting--something bad is going to happen.\"    9. Should Statements  Explanation: Using rigid and unrealistic \"should,\" \"must,\" or \"ought\" statements that create guilt or frustration when unmet.  Example: \"I should always be able to handle my problems on my own.\"    10. Personalization  Explanation: Blaming yourself for events outside your control or taking responsibility for things that aren t your fault.  Example: \"It s my fault the team didn t meet its deadline, even though I did my part.\"          How to Challenge Cognitive Distortions  Identify the Distortion: Notice when your thoughts fall into one of these categories.  Examine the Evidence: Ask yourself what objective evidence supports or contradicts the thought.  Reframe the Thought: Replace the distorted thought with a balanced, realistic one.  Example: Replace \"I failed the test, so I m a failure\" with \"This test didn t go well, but I can learn from it and improve next time.\"  Practice Mindfulness: Focus on the present moment to reduce overgeneralization or catastrophizing.  Seek Feedback: Share your thoughts with a trusted friend, therapist, or colleague for a reality check.  By addressing cognitive distortions, individuals can develop healthier thinking patterns and improve their emotional well-being.        This enhanced journaling exercise helps identify automatic thoughts, their associated cognitive distortions, and more balanced ways of thinking. It s designed to take just a few minutes.    Steps for Each Entry      Trigger:  What happened? Describe the situation briefly.  Example: \"I made a mistake in an email to my boss.\"      Thoughts:  What went through your mind? Write down the automatic " "thought.  Example: \"I m so careless. My boss will think I m incompetent.\"      Feelings:  What emotions did you feel? Name the emotion(s) and rate their intensity (0-100%).  Example: \"Embarrassment (80%), anxiety (90%).\"      Cognitive Distortion:  Look for any cognitive distortions in your thought (e.g., catastrophizing, all-or-nothing thinking).  Example: \"Catastrophizing--assuming the worst about how my boss will react.\"    Alternative Thought:  What s a more balanced or realistic way to think about this?  Example: \"Everyone makes mistakes sometimes. My boss has seen the good work I ve done before.\"                Practice 1  1) Trigger:       2) Thoughts:      3) Feelings:      4) Cognitive Distortion:      5) Alternative Thoughts:         Practice 2  1) Trigger:       2) Thoughts:      3) Feelings:      4) Cognitive Distortion:      5) Alternative Thoughts:       Practice 3  1) Trigger:       2) Thoughts:      3) Feelings:      4) Cognitive Distortion:      5) Alternative Thoughts:         To follow up with individual therapy: [phone] or follow up with [facility]     "

## 2025-01-20 ENCOUNTER — VIRTUAL VISIT (OUTPATIENT)
Dept: BEHAVIORAL HEALTH | Facility: CLINIC | Age: 35
End: 2025-01-20
Payer: COMMERCIAL

## 2025-01-20 DIAGNOSIS — F43.21 ADJUSTMENT DISORDER WITH DEPRESSED MOOD: Primary | ICD-10-CM

## 2025-01-20 PROCEDURE — 90832 PSYTX W PT 30 MINUTES: CPT | Mod: 95 | Performed by: MARRIAGE & FAMILY THERAPIST

## 2025-01-20 NOTE — PROGRESS NOTES
[facility] Primary [facility]: Integrated Behavioral Health    Behavioral Health Clinician Progress Note   Mental Health & Addiction Services      Monday January 20, 2025    Patient Name: Carleen Knox       Service Type:  Individual   Service Location:  MyChart / Email (patient reached)   Visit Start Time: 10:02 AM  Visit End Time:  1020AM    Session Length: 16 - 37    Attendees: Client   Service Modality: Video Visit:      Provider verified identity through the following two step process.  Patient provided:  Patient is known previously to provider    Telemedicine Visit: The patient's condition can be safely assessed and treated via synchronous audio and visual telemedicine encounter.      Reason for Telemedicine Visit: Patient has requested telehealth visit    Originating Site (Patient Location): Patient's home    Distant Site (Provider Location): [facility]    Consent:  The patient/guardian has verbally consented to: the potential risks and benefits of telemedicine (video visit) versus in person care; bill my insurance or make self-payment for services provided; and responsibility for payment of non-covered services.     Patient would like the video invitation sent by:  My Chart    Mode of Communication:  Video Conference via [facility]    Distant Location (Provider):  On-site    As the provider I attest to compliance with applicable laws and regulations related to telemedicine.   Visit number: 4    [facility] Visit Activities (Refresh list every visit): [facility] Only     Date of Brief Diagnostic Assessment : 1/26/25  Treatment Plan Review Date: 6/26/25      DATA:    Extended Session (60+ minutes): No   Interactive Complexity: No   Crisis: No   [facility] Patient: No     Assessments completed prior to visit:   PHQ9:       3/8/2021     3:38 PM 8/31/2021     1:04 PM 4/10/2023     8:35 AM 10/24/2023    11:42 AM 12/4/2023     9:52 AM 12/12/2024    10:21 PM  12/23/2024    10:22 AM   PHQ-9 SCORE   PHQ-9 Total Score MyChart   5 (Mild depression) 7 (Mild depression) 0 10 (Moderate depression) 19 (Moderately severe depression)   PHQ-9 Total Score 4 2 5 7 0 10  19        Patient-reported     GAD7:       11/11/2020    12:20 PM 3/8/2021     3:38 PM 8/31/2021     1:04 PM 12/19/2022    10:01 PM 10/18/2023    10:38 AM 1/25/2024    10:13 PM 12/12/2024    10:37 PM   CLARIBEL-7 SCORE   Total Score 3 (minimal anxiety)   8 (mild anxiety) 9 (mild anxiety) 0 (minimal anxiety) 17 (severe anxiety)   Total Score 3 2 1 8 9 0 17        Patient-reported     CAGE-AID:       12/12/2024    10:39 PM   CAGE-AID Total Score   Total Score 0    Total Score MyChart 0 (A total score of 2 or greater is considered clinically significant)       Patient-reported     PROMIS 10-Global Health (all questions and answers displayed):       12/12/2024    10:39 PM   PROMIS 10   In general, would you say your health is: Fair   In general, would you say your quality of life is: Fair   In general, how would you rate your physical health? Poor   In general, how would you rate your mental health, including your mood and your ability to think? Poor   In general, how would you rate your satisfaction with your social activities and relationships? Fair   In general, please rate how well you carry out your usual social activities and roles Fair   To what extent are you able to carry out your everyday physical activities such as walking, climbing stairs, carrying groceries, or moving a chair? Moderately   In the past 7 days, how often have you been bothered by emotional problems such as feeling anxious, depressed, or irritable? Always   In the past 7 days, how would you rate your fatigue on average? Moderate   In the past 7 days, how would you rate your pain on average, where 0 means no pain, and 10 means worst imaginable pain? 8   In general, would you say your health is: 2   In general, would you say your quality of life is: 2  "  In general, how would you rate your physical health? 1   In general, how would you rate your mental health, including your mood and your ability to think? 1   In general, how would you rate your satisfaction with your social activities and relationships? 2   In general, please rate how well you carry out your usual social activities and roles. (This includes activities at home, at work and in your community, and responsibilities as a parent, child, spouse, employee, friend, etc.) 2   To what extent are you able to carry out your everyday physical activities such as walking, climbing stairs, carrying groceries, or moving a chair? 3   In the past 7 days, how often have you been bothered by emotional problems such as feeling anxious, depressed, or irritable? 5   In the past 7 days, how would you rate your fatigue on average? 3   In the past 7 days, how would you rate your pain on average, where 0 means no pain, and 10 means worst imaginable pain? 8   Global Mental Health Score 6    Global Physical Health Score 9    PROMIS TOTAL - SUBSCORES 15        Patient-reported     [place] Suicide Severity Rating Scale (Lifetime/Recent)      8/4/2012     5:59 PM 12/26/2024    12:57 PM   [place] Suicide Severity Rating (Lifetime/Recent)   Do you have guns available to you? No    Q1 Wish to be Dead (Lifetime)  N   Q2 Non-Specific Active Suicidal Thoughts (Lifetime)  N   Actual Attempt (Lifetime)  N   Calculated C-SSRS Risk Score (Lifetime/Recent)  No Risk Indicated     \"     Reason for Visit/Presenting Concern:  Anxiety    Current Stressors / Issues:   Anxiety has continued to \"get better\" and has been \"setting boundaries\" with partner through the divorce. Pt has been being more assertive about boundaries with spouse's emotional responses. Pt has obtained an individual therapist through [facility] and will discontinued [facility] services    Therapeutic Interventions:  Dialectical Behavioral Therapy (DBT): Taught patient interpersonal " effectiveness skills.    Response to treatment interventions:   Patient was receptive to interventions utilized.  Patient was engaged in the therapy process.       Progress on Treatment Objective(s) / Homework:   New Objective established this session - ACTION (Actively working towards change); Intervened by reinforcing change plan / affirming steps taken     Medication Review:   No current psychiatric medications prescribed     Medication Compliance:   NA     Chemical Use Review:  Substance Use: Chemical use reviewed, no active concerns identified      Tobacco Use: No current tobacco use.       Assessment: Current Emotional / Mental Status (status of significant symptoms):    Risk status (Self / Other harm or suicidal ideation)   Patient denies a history of suicidal ideation, suicide attempts, self-injurious behavior, homicidal ideation, homicidal behavior, and and other safety concerns   Patient denies current fears or concerns for personal safety.   Patient denies current or recent suicidal ideation or behaviors.   Patient denies current or recent homicidal ideation or behaviors.   Patient denies current or recent self injurious behavior or ideation.   Patient denies other safety concerns.   Recommended that patient call 911 or go to the local ED should there be a change in any of these risk factors      ASSESSMENT:   Mental Status:     Appearance:   Appropriate    Eye Contact:   Good    Psychomotor Behavior: Normal    Attitude:   Cooperative    Orientation:   All   Speech Rate / Production: Normal    Volume:   Normal    Mood:    Normal   Affect:    Appropriate    Thought Content:  Clear    Thought Form:  Coherent  Logical    Insight:    Good          Diagnoses:   Data Unavailable    Collateral Reports Completed:   Routed note to PCP       Plan: (Homework, other):   Patient was provided No indications of CD issues  Patient was given information about behavioral services and encouraged to schedule a follow up  appointment with the clinic [facility]  if needed in the future - otherwise [facility] serviced discontinued .      Patient is transferring to [facility] therapist this week     Stevie SULLIVAN, [facility]     _____________________________________________________________________________________________________________________________________                                              Individual Treatment Plan    Patient's Name: Carleen Knox   YOB: 1990  Date of Creation: 1/20/25  Date Treatment Plan Last Reviewed/Revised: 6/20/25    DSM5 Diagnoses: Adjustment Disorders  309.0 (F43.21) With depressed mood  Psychosocial / Contextual Factors: Relationship Concerns and Grief/Loss  PROMIS (reviewed every 90 days):   The following assessments were completed by patient for this visit:  PHQ9:       3/8/2021     3:38 PM 8/31/2021     1:04 PM 4/10/2023     8:35 AM 10/24/2023    11:42 AM 12/4/2023     9:52 AM 12/12/2024    10:21 PM 12/23/2024    10:22 AM   PHQ-9 SCORE   PHQ-9 Total Score MyChart   5 (Mild depression) 7 (Mild depression) 0 10 (Moderate depression) 19 (Moderately severe depression)   PHQ-9 Total Score 4 2 5 7 0 10  19        Patient-reported     GAD7:       11/11/2020    12:20 PM 3/8/2021     3:38 PM 8/31/2021     1:04 PM 12/19/2022    10:01 PM 10/18/2023    10:38 AM 1/25/2024    10:13 PM 12/12/2024    10:37 PM   CLARIBEL-7 SCORE   Total Score 3 (minimal anxiety)   8 (mild anxiety) 9 (mild anxiety) 0 (minimal anxiety) 17 (severe anxiety)   Total Score 3 2 1 8 9 0 17        Patient-reported     CAGE-AID:       12/12/2024    10:39 PM   CAGE-AID Total Score   Total Score 0    Total Score MyChart 0 (A total score of 2 or greater is considered clinically significant)       Patient-reported     PROMIS 10-Global Health (all questions and answers displayed):       12/12/2024    10:39 PM   PROMIS 10   In general, would you say your health is: Fair   In general, would you say your quality of life is: Fair   In general, how  would you rate your physical health? Poor   In general, how would you rate your mental health, including your mood and your ability to think? Poor   In general, how would you rate your satisfaction with your social activities and relationships? Fair   In general, please rate how well you carry out your usual social activities and roles Fair   To what extent are you able to carry out your everyday physical activities such as walking, climbing stairs, carrying groceries, or moving a chair? Moderately   In the past 7 days, how often have you been bothered by emotional problems such as feeling anxious, depressed, or irritable? Always   In the past 7 days, how would you rate your fatigue on average? Moderate   In the past 7 days, how would you rate your pain on average, where 0 means no pain, and 10 means worst imaginable pain? 8   In general, would you say your health is: 2   In general, would you say your quality of life is: 2   In general, how would you rate your physical health? 1   In general, how would you rate your mental health, including your mood and your ability to think? 1   In general, how would you rate your satisfaction with your social activities and relationships? 2   In general, please rate how well you carry out your usual social activities and roles. (This includes activities at home, at work and in your community, and responsibilities as a parent, child, spouse, employee, friend, etc.) 2   To what extent are you able to carry out your everyday physical activities such as walking, climbing stairs, carrying groceries, or moving a chair? 3   In the past 7 days, how often have you been bothered by emotional problems such as feeling anxious, depressed, or irritable? 5   In the past 7 days, how would you rate your fatigue on average? 3   In the past 7 days, how would you rate your pain on average, where 0 means no pain, and 10 means worst imaginable pain? 8   Global Mental Health Score 6    Global Physical  Health Score 9    PROMIS TOTAL - SUBSCORES 15        Patient-reported     [place] Suicide Severity Rating Scale (Lifetime/Recent)      8/4/2012     5:59 PM 12/26/2024    12:57 PM   [place] Suicide Severity Rating (Lifetime/Recent)   Do you have guns available to you? No    Q1 Wish to be Dead (Lifetime)  N   Q2 Non-Specific Active Suicidal Thoughts (Lifetime)  N   Actual Attempt (Lifetime)  N   Calculated C-SSRS Risk Score (Lifetime/Recent)  No Risk Indicated        Referral / Collaboration:  [facility] therapist transfer .    Anticipated number of session for this episode of care:  3-6 sessions  Anticipation frequency of session: Biweekly  Anticipated Duration of each session: 16-37 minutes  Treatment plan will be reviewed in 90 days or when goals have been changed.       MeasurableTreatment Goal(s) related to diagnosis / functional impairment(s)  Goal 1: Patient will learn and utilize skills for coping with triggers.   Patient will transfer to long term therapist to build skills for coping with divorce     Status: New - Date: 1/20/25      Intervention(s)  [facility] will Dialectical Behavioral Therapy (DBT): Teach patient interpersonal effectiveness skills.         Patient has reviewed and agreed to the above plan.     Written by  Stevie SULLIVAN, [facility]